# Patient Record
Sex: FEMALE | Race: WHITE | Employment: FULL TIME | URBAN - METROPOLITAN AREA
[De-identification: names, ages, dates, MRNs, and addresses within clinical notes are randomized per-mention and may not be internally consistent; named-entity substitution may affect disease eponyms.]

---

## 2017-11-02 ENCOUNTER — OFFICE VISIT (OUTPATIENT)
Dept: NEUROLOGY | Age: 57
End: 2017-11-02

## 2017-11-02 VITALS
BODY MASS INDEX: 34.66 KG/M2 | DIASTOLIC BLOOD PRESSURE: 100 MMHG | WEIGHT: 208 LBS | HEIGHT: 65 IN | SYSTOLIC BLOOD PRESSURE: 150 MMHG

## 2017-11-02 DIAGNOSIS — R20.0 NUMBNESS AND TINGLING: Primary | ICD-10-CM

## 2017-11-02 DIAGNOSIS — M79.609 PAIN IN EXTREMITY, UNSPECIFIED EXTREMITY: ICD-10-CM

## 2017-11-02 DIAGNOSIS — R20.2 NUMBNESS AND TINGLING: Primary | ICD-10-CM

## 2017-11-02 RX ORDER — VALACYCLOVIR HYDROCHLORIDE 500 MG/1
TABLET, FILM COATED ORAL 2 TIMES DAILY
Status: ON HOLD | COMMUNITY
End: 2022-04-19

## 2017-11-02 RX ORDER — RANITIDINE 150 MG/1
150 TABLET, FILM COATED ORAL 2 TIMES DAILY
Status: ON HOLD | COMMUNITY
End: 2022-04-19

## 2017-11-02 RX ORDER — BUMETANIDE 2 MG/1
2 TABLET ORAL DAILY
Status: ON HOLD | COMMUNITY
End: 2022-04-19

## 2017-11-02 RX ORDER — TOPIRAMATE 25 MG/1
TABLET ORAL 2 TIMES DAILY
COMMUNITY
End: 2017-12-27

## 2017-11-02 RX ORDER — TORSEMIDE 20 MG/1
TABLET ORAL DAILY
Status: ON HOLD | COMMUNITY
End: 2022-04-19

## 2017-11-02 RX ORDER — AMITRIPTYLINE HYDROCHLORIDE 10 MG/1
TABLET, FILM COATED ORAL
COMMUNITY
End: 2017-12-27

## 2017-11-02 RX ORDER — POTASSIUM CHLORIDE 750 MG/1
10 CAPSULE, EXTENDED RELEASE ORAL DAILY
COMMUNITY

## 2017-11-02 RX ORDER — PHENTERMINE HYDROCHLORIDE 30 MG/1
30 CAPSULE ORAL
COMMUNITY
End: 2017-12-27
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
## 2017-11-02 NOTE — MR AVS SNAPSHOT
Visit Information Date & Time Provider Department Dept. Phone Encounter #  
 11/2/2017  1:30 PM Tianna Leyva NP [address] [facility] [phone] [id number] Follow-up Instructions Return after testing. Upcoming Health Maintenance Date Due Hepatitis C Screening 1960 DTaP/Tdap/Td series (1 - Tdap) 9/7/1981 PAP AKA CERVICAL CYTOLOGY 9/7/1981 BREAST CANCER SCRN MAMMOGRAM 9/7/2010 FOBT Q 1 YEAR AGE 50-75 9/7/2010 INFLUENZA AGE 9 TO ADULT 8/1/2017 Allergies as of 11/2/2017  Never Reviewed Severity Noted Reaction Type Reactions Thimerosal  11/02/2017    Unknown (comments) Current Immunizations  Never Reviewed No immunizations on file. Not reviewed this visit You Were Diagnosed With   
  
 Codes Comments Numbness and tingling    -  Primary ICD-10-CM: R20.0, R20.2 ICD-9-CM: 782.0 Pain in extremity, unspecified extremity     ICD-10-CM: M79.609 ICD-9-CM: 729.5 Vitals BP Height(growth percentile) Weight(growth percentile) BMI Smoking Status (!) 150/100 5' 5\" (1.651 m) 208 lb (94.3 kg) 34.61 kg/m2 Never Smoker Vitals History BMI and BSA Data Body Mass Index Body Surface Area  
 34.61 kg/m 2 2.08 m 2 Your Updated Medication List  
  
   
This list is accurate as of: 11/2/17  2:14 PM.  Always use your most recent med list.  
  
  
  
  
 Rohini Layer Take  by mouth. amitriptyline 10 mg tablet Commonly known as:  ELAVIL Take  by mouth nightly. bumetanide 2 mg tablet Commonly known as:  Fabi Juliet Take 2 mg by mouth daily. LINZESS 290 mcg Cap capsule Generic drug:  linaclotide Take  by mouth Daily (before breakfast). NEXIUM 24HR PO Take  by mouth.  
  
 phentermine 30 mg capsule Take 30 mg by mouth every morning. potassium chloride SA 10 mEq capsule Commonly known as:  Jimi Se Take 10 mEq by mouth two (2) times a day.   
  
 topiramate 25 mg tablet Commonly known as:  TOPAMAX Take  by mouth two (2) times a day. torsemide 20 mg tablet Commonly known as:  DEMADEX Take  by mouth daily. valACYclovir 500 mg tablet Commonly known as:  VALTREX Take  by mouth two (2) times a day. ZANTAC 150 mg tablet Generic drug:  raNITIdine Take 150 mg by mouth two (2) times a day. Follow-up Instructions Return after testing. To-Do List   
 11/02/2017 Neurology:  EMG LIMITED Patient Instructions PRESCRIPTION REFILL POLICY [facility] Statement to Patients April 1, 2014 In an effort to ensure the large volume of patient prescription refills is processed in the most efficient and expeditious manner, we are asking our patients to assist us by calling your Pharmacy for all prescription refills, this will include also your  Mail Order Pharmacy. The pharmacy will contact our office electronically to continue the refill process. Please do not wait until the last minute to call your pharmacy. We need at least 48 hours (2days) to fill prescriptions. We also encourage you to call your pharmacy before going to  your prescription to make sure it is ready. With regard to controlled substance prescription refill requests (narcotic refills) that need to be picked up at our office, we ask your cooperation by providing us with at least 72 hours (3days) notice that you will need a refill. We will not refill narcotic prescription refill requests after 4:00pm on any weekday, Monday through Thursday, or after 2:00pm on Fridays, or on the weekends. We encourage everyone to explore another way of getting your prescription refill request processed using [employer], our patient web portal through our electronic medical record system.  AGLOGIC is an efficient and effective way to communicate your medication request directly to the office and  downloadable as an laron on your smart phone . [employer] also features a review functionality that allows you to view your medication list as well as leave messages for your physician. Are you ready to get connected? If so please review the attatched instructions or speak to any of our staff to get you set up right away! Thank you so much for your cooperation. Should you have any questions please contact our Practice Administrator. The Physicians and Staff,  [facility] Babak Regan [address] What is a living will? A living will is a legal form you use to write down the kind of care you want at the end of your life. It is used by the health professionals who will treat you if you aren't able to decide for yourself. If you put your wishes in writing, your loved ones and others will know what kind of care you want. They won't need to guess. This can ease your mind and be helpful to others. A living will is not the same as an estate or property will. An estate will explains what you want to happen with your money and property after you die. Is a living will a legal document? A living will is a legal document. Each state has its own laws about living rus[place]. If you move to another state, make sure that your living will is legal in the state where you now live. Or you might use a universal form that has been approved by many states. This kind of form can sometimes be completed and stored online. Your electronic copy will then be available wherever you have a connection to the Internet. In most cases, doctors will respect your wishes even if you have a form from a different state. · You don't need an  to complete a living will. But legal advice can be helpful if your state's laws are unclear, your health history is complicated, or your family can't agree on what should be in your living will. · You can change your living will at any time.  Some people find that their wishes about end-of-life care change as their health changes. · In addition to making a living will, think about completing a medical power of  form. This form lets you name the person you want to make end-of-life treatment decisions for you (your \"health care agent\") if you're not able to. Many hospitals and nursing homes will give you the forms you need to complete a living will and a medical power of . · Your living will is used only if you can't make or communicate decisions for yourself anymore. If you become able to make decisions again, you can accept or refuse any treatment, no matter what you wrote in your living will. · Your state may offer an online registry. This is a place where you can store your living will online so the doctors and nurses who need to treat you can find it right away. What should you think about when creating a living will? Talk about your end-of-life wishes with your family members and your doctor. Let them know what you want. That way the people making decisions for you won't be surprised by your choices. Think about these questions as you make your living will: · Do you know enough about life support methods that might be used? If not, talk to your doctor so you know what might be done if you can't breathe on your own, your heart stops, or you're unable to swallow. · What things would you still want to be able to do after you receive life-support methods? Would you want to be able to walk? To speak? To eat on your own? To live without the help of machines? · If you have a choice, where do you want to be cared for? In your home? At a hospital or nursing home? · Do you want certain [employer] practices performed if you become very ill? · If you have a choice at the end of your life, where would you prefer to die? At home? In a hospital or nursing home? Somewhere else? · Would you prefer to be buried or cremated? · Do you want your organs to be donated after you die?  
What should you do with your living will? · Make sure that your family members and your health care agent have copies of your living will. · Give your doctor a copy of your living will to keep in your medical record. If you have more than one doctor, make sure that each one has a copy. · You may want to put a copy of your living will where it can be easily found. Where can you learn more? Go to http://stephan-jc.info/. Enter L296 in the search box to learn more about \"Learning About Living [place]. \" Current as of: September 24, 2016 Content Version: 11.4 © 5694-3983 [employer]. Care instructions adapted under license by [employer] (which disclaims liability or warranty for this information). If you have questions about a medical condition or this instruction, always ask your healthcare professional. [address] any warranty or liability for your use of this information. Advance Directives: Care Instructions Your Care Instructions An advance directive is a legal way to state your wishes at the end of your life. It tells your family and your doctor what to do if you can no longer say what you want. There are two main types of advance directives. You can change them any time that your wishes change. · A living will tells your family and your doctor your wishes about life support and other treatment. · A durable power of  for health care lets you name a person to make treatment decisions for you when you can't speak for yourself. This person is called a health care agent. If you do not have an advance directive, decisions about your medical care may be made by a doctor or a  who doesn't know you. It may help to think of an advance directive as a gift to the people who care for you. If you have one, they won't have to make tough decisions by themselves. Follow-up care is a key part of your treatment and safety.  Be sure to make and go to all appointments, and call your doctor if you are having problems. It's also a good idea to know your test results and keep a list of the medicines you take. How can you care for yourself at home? · Discuss your wishes with your loved ones and your doctor. This way, there are no surprises. · Many states have a unique form. Or you might use a universal form that has been approved by many states. This kind of form can sometimes be completed and stored online. Your electronic copy will then be available wherever you have a connection to the Internet. In most cases, doctors will respect your wishes even if you have a form from a different state. · You don't need a  to do an advance directive. But you may want to get legal advice. · Think about these questions when you prepare an advance directive: ¨ Who do you want to make decisions about your medical care if you are not able to? Many people choose a family member or close friend. ¨ Do you know enough about life support methods that might be used? If not, talk to your doctor so you understand. ¨ What are you most afraid of that might happen? You might be afraid of having pain, losing your independence, or being kept alive by machines. ¨ Where would you prefer to die? Choices include your home, a hospital, or a nursing home. ¨ Would you like to have information about hospice care to support you and your family? ¨ Do you want to donate organs when you die? ¨ Do you want certain [employer] practices performed before you die? If so, put your wishes in the advance directive. · Read your advance directive every year, and make changes as needed. When should you call for help? Be sure to contact your doctor if you have any questions. Where can you learn more? Go to http://stephan-jc.info/. Enter R264 in the search box to learn more about \"Advance Directives: Care Instructions. \" Current as of: September 24, 2016 Content Version: 11.4 © 1129-6625 [employer]. Care instructions adapted under license by [employer] (which disclaims liability or warranty for this information). If you have questions about a medical condition or this instruction, always ask your healthcare professional. [address] any warranty or liability for your use of this information. Introducing [facility] [place] [facility]! Padmini Steve introduces [employer] patient portal. Now you can access parts of your medical record, email your doctor's office, and request medication refills online. 1. In your internet browser, go to [email or web address] 2. Click on the First Time User? Click Here link in the Sign In box. You will see the New Member Sign Up page. 3. Enter your [employer] Access Code exactly as it appears below. You will not need to use this code after youve completed the sign-up process. If you do not sign up before the expiration date, you must request a new code. · [employer] Access Code: [email or web address] Expires: 1/31/2018  2:13 PM 
 
4. Enter the last four digits of your Social Security Number (xxxx) and Date of Birth (mm/dd/yyyy) as indicated and click Submit. You will be taken to the next sign-up page. 5. Create a [employer] ID. This will be your [employer] login ID and cannot be changed, so think of one that is secure and easy to remember. 6. Create a [employer] password. You can change your password at any time. 7. Enter your Password Reset Question and Answer. This can be used at a later time if you forget your password. 8. Enter your e-mail address. You will receive e-mail notification when new information is available in [address] 9. Click Sign Up. You can now view and download portions of your medical record. 10. Click the Download Summary menu link to download a portable copy of your medical information.  
 
If you have questions, please visit the Frequently Asked Questions section of the [employer] website. Remember,  is NOT to be used for urgent needs. For medical emergencies, dial 911. Now available from your iPhone and Android! Please provide this summary of care documentation to your next provider. Your primary care clinician is listed as Sergio Mustafa. If you have any questions after today's visit, please call [phone].

## 2017-11-02 NOTE — PROGRESS NOTES
Patient is here for numbness and tingling in both arms and legs at times and pain when she lays down. Numbness occurs in arms when bent at 90 degree angle. It is now uncomfortable to sit for long length of time. Pain mainly on the left side. When laying on her right side her left arm and leg are in pain, elbow and knee being the most painful.

## 2017-11-02 NOTE — PATIENT INSTRUCTIONS
10 [facility]   Statement to Patients  April 1, 2014      In an effort to ensure the large volume of patient prescription refills is processed in the most efficient and expeditious manner, we are asking our patients to assist us by calling your Pharmacy for all prescription refills, this will include also your  Mail Order Pharmacy. The pharmacy will contact our office electronically to continue the refill process. Please do not wait until the last minute to call your pharmacy. We need at least 48 hours (2days) to fill prescriptions. We also encourage you to call your pharmacy before going to  your prescription to make sure it is ready. With regard to controlled substance prescription refill requests (narcotic refills) that need to be picked up at our office, we ask your cooperation by providing us with at least 72 hours (3days) notice that you will need a refill. We will not refill narcotic prescription refill requests after 4:00pm on any weekday, Monday through Thursday, or after 2:00pm on Fridays, or on the weekends. We encourage everyone to explore another way of getting your prescription refill request processed using Yotta[employer], our patient web portal through our electronic medical record system. [employer]ottaa is an efficient and effective way to communicate your medication request directly to the office and  downloadable as an laron on your smart phone . Yottaa also features a review functionality that allows you to view your medication list as well as leave messages for your physician. Are you ready to get connected? If so please review the attatched instructions or speak to any of our staff to get you set up right away! Thank you so much for your cooperation. Should you have any questions please contact our Practice Administrator.     The Physicians and Staff,  [facility] [id number] [address]  What is a living will?    A living will is a legal form you use to write down the kind of care you want at the end of your life. It is used by the health professionals who will treat you if you aren't able to decide for yourself. If you put your wishes in writing, your loved ones and others will know what kind of care you want. They won't need to guess. This can ease your mind and be helpful to others. A living will is not the same as an estate or property will. An estate will explains what you want to happen with your money and property after you die. Is a living will a legal document? A living will is a legal document. Each state has its own laws about living russo. If you move to another state, make sure that your living will is legal in the state where you now live. Or you might use a universal form that has been approved by many states. This kind of form can sometimes be completed and stored online. Your electronic copy will then be available wherever you have a connection to the Internet. In most cases, doctors will respect your wishes even if you have a form from a different state. · You don't need an  to complete a living will. But legal advice can be helpful if your state's laws are unclear, your health history is complicated, or your family can't agree on what should be in your living will. · You can change your living will at any time. Some people find that their wishes about end-of-life care change as their health changes. · In addition to making a living will, think about completing a medical power of  form. This form lets you name the person you want to make end-of-life treatment decisions for you (your \"health care agent\") if you're not able to. Many hospitals and nursing homes will give you the forms you need to complete a living will and a medical power of . · Your living will is used only if you can't make or communicate decisions for yourself anymore.  If you become able to make decisions again, you can accept or refuse any treatment, no matter what you wrote in your living will. · Your state may offer an online registry. This is a place where you can store your living will online so the doctors and nurses who need to treat you can find it right away. What should you think about when creating a living will? Talk about your end-of-life wishes with your family members and your doctor. Let them know what you want. That way the people making decisions for you won't be surprised by your choices. Think about these questions as you make your living will:  · Do you know enough about life support methods that might be used? If not, talk to your doctor so you know what might be done if you can't breathe on your own, your heart stops, or you're unable to swallow. · What things would you still want to be able to do after you receive life-support methods? Would you want to be able to walk? To speak? To eat on your own? To live without the help of machines? · If you have a choice, where do you want to be cared for? In your home? At a hospital or nursing home? · Do you want certain Yarsanism practices performed if you become very ill? · If you have a choice at the end of your life, where would you prefer to die? At home? In a hospital or nursing home? Somewhere else? · Would you prefer to be buried or cremated? · Do you want your organs to be donated after you die? What should you do with your living will? · Make sure that your family members and your health care agent have copies of your living will. · Give your doctor a copy of your living will to keep in your medical record. If you have more than one doctor, make sure that each one has a copy. · You may want to put a copy of your living will where it can be easily found. Where can you learn more? Go to http://stephan-jc.info/. Enter K906 in the search box to learn more about \"Learning About Living Perdaniel. \"  Current as of: September 24, 2016  Content Version: 11.4  © 7352-3872 [employer]. Care instructions adapted under license by [employer] (which disclaims liability or warranty for this information). If you have questions about a medical condition or this instruction, always ask your healthcare professional. [address] any warranty or liability for your use of this information. Advance Directives: Care Instructions  Your Care Instructions  An advance directive is a legal way to state your wishes at the end of your life. It tells your family and your doctor what to do if you can no longer say what you want. There are two main types of advance directives. You can change them any time that your wishes change. · A living will tells your family and your doctor your wishes about life support and other treatment. · A durable power of  for health care lets you name a person to make treatment decisions for you when you can't speak for yourself. This person is called a health care agent. If you do not have an advance directive, decisions about your medical care may be made by a doctor or a  who doesn't know you. It may help to think of an advance directive as a gift to the people who care for you. If you have one, they won't have to make tough decisions by themselves. Follow-up care is a key part of your treatment and safety. Be sure to make and go to all appointments, and call your doctor if you are having problems. It's also a good idea to know your test results and keep a list of the medicines you take. How can you care for yourself at home? · Discuss your wishes with your loved ones and your doctor. This way, there are no surprises. · Many states have a unique form. Or you might use a universal form that has been approved by many states. This kind of form can sometimes be completed and stored online.  Your electronic copy will then be available wherever you have a connection to the Internet. In most cases, doctors will respect your wishes even if you have a form from a different state. · You don't need a  to do an advance directive. But you may want to get legal advice. · Think about these questions when you prepare an advance directive:  ¨ Who do you want to make decisions about your medical care if you are not able to? Many people choose a family member or close friend. ¨ Do you know enough about life support methods that might be used? If not, talk to your doctor so you understand. ¨ What are you most afraid of that might happen? You might be afraid of having pain, losing your independence, or being kept alive by machines. ¨ Where would you prefer to die? Choices include your home, a hospital, or a nursing home. ¨ Would you like to have information about hospice care to support you and your family? ¨ Do you want to donate organs when you die? ¨ Do you want certain Restorationist practices performed before you die? If so, put your wishes in the advance directive. · Read your advance directive every year, and make changes as needed. When should you call for help? Be sure to contact your doctor if you have any questions. Where can you learn more? Go to http://stephan-jc.info/. Enter R264 in the search box to learn more about \"Advance Directives: Care Instructions. \"  Current as of: September 24, 2016  Content Version: 11.4  © 3401-7411 [employer]. Care instructions adapted under license by [employer] (which disclaims liability or warranty for this information). If you have questions about a medical condition or this instruction, always ask your healthcare professional. [address] any warranty or liability for your use of this information.

## 2017-11-06 ENCOUNTER — OFFICE VISIT (OUTPATIENT)
Dept: NEUROLOGY | Age: 57
End: 2017-11-06

## 2017-11-06 DIAGNOSIS — R20.2 PARESTHESIA: Primary | ICD-10-CM

## 2017-11-06 NOTE — PROCEDURES
EMG/ NCS Report  [facility], [address]  Ph: [phone]/ [phone]  FAX: [phone]/ [phone]  Test Date:  2017    Patient: Colleen Ramirez : 1960 Physician: Perry Francisco MD   Sex: Female Height: ' \" Ref Phys: Becca Neigh   ID#: [id number] Weight:  lbs. Technician: Oralia Henry     Patient History / Exam:  CC:NUMBNESS,TINGLING IN ALL EXTREMITIES.; LEFT WORSE THAN RIGHT. Patient is coming for neuropathy evaluation      EMG & NCV Findings:  Evaluation of the left Fibular motor nerve showed normal distal onset latency (3.4 ms), normal amplitude (4.0 mV), normal conduction velocity (B Fib-Ankle, 46 m/s), and normal conduction velocity (Poplt-B Fib, 48 m/s). The left median motor nerve showed normal distal onset latency (4.5 ms), normal amplitude (9.4 mV), and normal conduction velocity (Elbow-Wrist, 53 m/s). The left tibial motor nerve showed normal distal onset latency (6.0 ms), normal amplitude (11.5 mV), and normal conduction velocity (Knee-Ankle, 60 m/s). The left ulnar motor nerve showed normal distal onset latency (2.7 ms), normal amplitude (11.1 mV), normal conduction velocity (B Elbow-Wrist, 58 m/s), and normal conduction velocity (A Elbow-B Elbow, 50 m/s). The left median sensory nerve showed prolonged distal peak latency (4.5 ms) and normal amplitude (57.1 µV). The left radial sensory, the left sural sensory, and the left ulnar sensory nerves showed normal distal peak latency (L2.1, L3.9, L3.9 ms) and normal amplitude (L53.1, L16.4, L34.0 µV). All F Wave latencies were within normal limits. All examined muscles (as indicated in the following table) showed no evidence of electrical instability.         Impression:  ABNORMAL    Extensive electrodiagnostic examination of the left upper and left lower extremities shows evidence of a left median neuropathy at or distal to the wrist, consistent with a clinical diagnosis of carpal tunnel syndrome, mild in degree electrically. There is no evidence of a left cervical motor radiculopathy. Electrodiagnostic examination of the left lower extremity is within normal limits.           Miracle Rojas MD  Diplomate, American Board of Psychiatry and Neurology  Diplomate, Neuromuscular Medicine  Diplomate, American Board of Electrodiagnostic Medicine        Nerve Conduction Studies  Anti Sensory Summary Table     Stim Site NR Peak (ms) Norm Peak (ms) P-T Amp (µV) Norm P-T Amp Site1 Site2 Dist (cm)   Left Median Anti Sensory (2nd Digit)  32.8°C   Wrist    4.5 <4 57.1 >13 Wrist 2nd Digit 14.0   Left Radial Anti Sensory (Base 1st Digit)  30.5°C   Wrist    2.1 <2.8 53.1 >11 Wrist Base 1st Digit 10.0   Left Sural Anti Sensory (Lat Mall)  24°C   Calf    3.9 <4.5 16.4 >4.0 Calf Lat Mall 14.0   Left Ulnar Anti Sensory (5th Digit)  31.3°C   Wrist    3.9 <4.0 34.0 >9 Wrist 5th Digit 14.0     Motor Summary Table     Stim Site NR Onset (ms) Norm Onset (ms) O-P Amp (mV) Norm O-P Amp Amp (Prev) (%) Site1 Site2 Dist (cm) Nadir (m/s) Norm Nadir (m/s)   Left Fibular Motor (Ext Dig Brev)  29.9°C   Ankle    3.4 <6.5 4.0 >2.6 100.0 Ankle Ext Dig Brev 8.0     B Fib    10.6  4.0  100.0 B Fib Ankle 33.0 46 >38   Poplt    12.7  4.1  102.5 Poplt B Fib 10.0 48 >42   Left Median Motor (Abd Poll Brev)  30.7°C   Wrist    4.5 <4.5 9.4 >4.1 100.0 Wrist Abd Poll Brev 8.0     Elbow    8.2  9.1  96.8 Elbow Wrist 19.5 53 >49   Left Tibial Motor (Abd Moore Brev)  28.8°C   Ankle    6.0 <6.1 11.5 >5.3 100.0 Ankle Abd Moore Brev 8.0     Knee    12.0  6.6  57.4 Knee Ankle 36.0 60 >39   Left Ulnar Motor (Abd Dig Minimi)  30.3°C   Wrist    2.7 <3.1 11.1 >7.0 100.0 Wrist Abd Dig Minimi 8.0  >50   B Elbow    6.3  10.8  97.3 B Elbow Wrist 21.0 58 >50   A Elbow    8.3  10.6  98.1 A Elbow B Elbow 10.0 50 >50     F Wave Studies     NR F-Lat (ms) Lat Norm (ms) L-R F-Lat (ms) L-R Lat Norm   Left Peroneal (Mrkrs) (EDB)  28.2°C      47.36 <56  <5.1   Left Ulnar (Mrkrs) (Abd Dig Min)  29.9°C      28.70 <32  <2.5     H Reflex Studies     NR H-Lat (ms) L-R H-Lat (ms) L-R Lat Norm   Left Tibial (Gastroc)  27.9°C      33.20  <2.0     EMG     Side Muscle Nerve Root Ins Act Fibs Psw Recrt Duration Amp Poly Comment   Left Ext Dig Brev Dp Br Peron L5, S1 Nml Nml Nml Nml Nml Nml Nml    Left AbdHallucis MedPlantar S1-2 Nml Nml Nml Nml Nml Nml Nml    Left AntTibialis Dp Br Peron L4-5 Nml Nml Nml Nml Nml Nml Nml    Left MedGastroc Tibial S1-2 Nml Nml Nml Nml Nml Nml Nml    Left VastusLat Femoral L2-4 Nml Nml Nml Nml Nml Nml Nml    Left 1stDorInt Ulnar C8-T1 Nml Nml Nml Nml Nml Nml Nml    Left ExtIndicis Radial (Post Int) C7-8 Nml Nml Nml Nml Nml Nml Nml    Left Abd Poll Brev Median C8-T1 Nml Nml Nml Nml Nml Nml Nml    Left Biceps Musculocut C5-6 Nml Nml Nml Nml Nml Nml Nml    Left Triceps Radial C6-7-8 Nml Nml Nml Nml Nml Nml Nml    Left Deltoid Axillary C5-6 Nml Nml Nml Nml Nml Nml Nml    Left Lower Cerv Parasp Rami C7,T1 Nml Nml Nml Nml Nml Nml Nml    Left GluteusMed SupGluteal L4-S1 Nml Nml Nml Nml Nml Nml Nml    Left Lower Lumb Parasp Rami L5,S1 Nml Nml Nml Nml Nml Nml Nml                Nerve Conduction Studies  Anti Sensory Left/Right Comparison     Stim Site L Lat (ms) R Lat (ms) L-R Lat (ms) L Amp (µV) R Amp (µV) L-R Amp (%) Site1 Site2 L Nadir (m/s) R Andir (m/s) L-R Nadir (m/s)   Median Anti Sensory (2nd Digit)  32.8°C   Wrist 3.3   57.1   Wrist 2nd Digit 42     Radial Anti Sensory (Base 1st Digit)  30.5°C   Wrist 1.7   53.1   Wrist Base 1st Digit 59     Sural Anti Sensory (Lat Mall)  24°C   Calf 3.3   16.4   Calf Lat Mall 42     Ulnar Anti Sensory (5th Digit)  31.3°C   Wrist 3.1   34.0   Wrist 5th Digit 45       Motor Left/Right Comparison     Stim Site L Lat (ms) R Lat (ms) L-R Lat (ms) L Amp (mV) R Amp (mV) L-R Amp (%) Site1 Site2 L Nadir (m/s) R Nadir (m/s) L-R Nadir (m/s)   Fibular Motor (Ext Dig Brev)  29.9°C   Ankle 3.4   4.0   Ankle Ext Dig Brev      B Fib 10.6   4.0   B Fib Ankle 46     Poplt 12.7   4.1   Poplt B Fib 48     Median Motor (Abd Poll Brev)  30.7°C   Wrist 4.5   9.4   Wrist Abd Poll Brev      Elbow 8.2   9.1   Elbow Wrist 53     Tibial Motor (Abd Moore Brev)  28.8°C   Ankle 6.0   11.5   Ankle Abd Moore Brev      Knee 12.0   6.6   Knee Ankle 60     Ulnar Motor (Abd Dig Minimi)  30.3°C   Wrist 2.7   11.1   Wrist Abd Dig Minimi      B Elbow 6.3   10.8   B Elbow Wrist 58     A Elbow 8.3   10.6   A Elbow B Elbow 50           Waveforms:

## 2017-11-29 ENCOUNTER — OFFICE VISIT (OUTPATIENT)
Dept: NEUROLOGY | Age: 57
End: 2017-11-29

## 2017-11-29 VITALS
SYSTOLIC BLOOD PRESSURE: 124 MMHG | BODY MASS INDEX: 33.49 KG/M2 | RESPIRATION RATE: 20 BRPM | WEIGHT: 201 LBS | HEIGHT: 65 IN | DIASTOLIC BLOOD PRESSURE: 70 MMHG

## 2017-11-29 DIAGNOSIS — R20.2 PARESTHESIA OF UPPER AND LOWER EXTREMITIES OF BOTH SIDES: ICD-10-CM

## 2017-11-29 DIAGNOSIS — R20.0 NUMBNESS AND TINGLING: Primary | ICD-10-CM

## 2017-11-29 DIAGNOSIS — R20.2 NUMBNESS AND TINGLING: Primary | ICD-10-CM

## 2017-11-29 RX ORDER — NORTRIPTYLINE HYDROCHLORIDE 10 MG/1
10 CAPSULE ORAL
Qty: 30 CAP | Refills: 5 | Status: SHIPPED | OUTPATIENT
Start: 2017-11-29 | End: 2017-12-27

## 2017-11-29 NOTE — MR AVS SNAPSHOT
Visit Information Date & Time Provider Department Dept. Phone Encounter #  
 11/29/2017  9:00 AM Ramon Rodriguez NP [facility] [phone] [id number] Follow-up Instructions Return afte rMRI. Upcoming Health Maintenance Date Due Hepatitis C Screening 1960 DTaP/Tdap/Td series (1 - Tdap) 9/7/1981 PAP AKA CERVICAL CYTOLOGY 9/7/1981 BREAST CANCER SCRN MAMMOGRAM 9/7/2010 FOBT Q 1 YEAR AGE 50-75 9/7/2010 Influenza Age 5 to Adult 8/1/2017 Allergies as of 11/29/2017  Review Complete On: 11/2/2017 By: Ramon Rodriguez NP Severity Noted Reaction Type Reactions Thimerosal  11/02/2017    Unknown (comments) Current Immunizations  Never Reviewed No immunizations on file. Not reviewed this visit You Were Diagnosed With   
  
 Codes Comments Numbness and tingling    -  Primary ICD-10-CM: R20.0, R20.2 ICD-9-CM: 782.0 Paresthesia of upper and lower extremities of both sides     ICD-10-CM: R20.2 ICD-9-CM: 026. 0 Vitals BP Resp Height(growth percentile) Weight(growth percentile) BMI Smoking Status 124/70 20 5' 5\" (1.651 m) 201 lb (91.2 kg) 33.45 kg/m2 Never Smoker Vitals History BMI and BSA Data Body Mass Index Body Surface Area  
 33.45 kg/m 2 2.05 m 2 Preferred Pharmacy Pharmacy Name Phone [facility] [address] [place] [address] [address]  [facility] [phone] Your Updated Medication List  
  
   
This list is accurate as of: 11/29/17  9:30 AM.  Always use your most recent med list.  
  
  
  
  
 Fer Filter Take  by mouth. amitriptyline 10 mg tablet Commonly known as:  ELAVIL Take  by mouth nightly. bumetanide 2 mg tablet Commonly known as:  Welby Shows Take 2 mg by mouth daily. LINZESS 290 mcg Cap capsule Generic drug:  linaclotide Take  by mouth Daily (before breakfast).   
  
 [facility]  
Take  by mouth. nortriptyline 10 mg capsule Commonly known as:  PAMELOR Take 1 Cap by mouth nightly. phentermine 30 mg capsule Take 30 mg by mouth every morning. potassium chloride SA 10 mEq capsule Commonly known as:  Aleja Paulo Take 10 mEq by mouth two (2) times a day. topiramate 25 mg tablet Commonly known as:  TOPAMAX Take  by mouth two (2) times a day. torsemide 20 mg tablet Commonly known as:  DEMADEX Take  by mouth daily. valACYclovir 500 mg tablet Commonly known as:  VALTREX Take  by mouth two (2) times a day. ZANTAC 150 mg tablet Generic drug:  raNITIdine Take 150 mg by mouth two (2) times a day. Prescriptions Sent to Pharmacy Refills  
 nortriptyline (PAMELOR) 10 mg capsule 5 Sig: Take 1 Cap by mouth nightly. Class: Normal  
 Pharmacy: [facility] [address] [place] [address] [address] AT [facility] [address] ([facility] Ph #: [phone] Route: Oral  
  
Follow-up Instructions Return afte [facility]. To-Do List   
 11/29/2017 Imaging:  MRI BRAIN W WO CONT   
  
 11/29/2017 Imaging:  MRI CERV SPINE W WO CONT Referral Information Referral ID Referred By Referred To  
  
 [id number] Jordyn Morillo Not Available Visits Status Start Date End Date 1 New Request 11/29/17 11/29/18 If your referral has a status of pending review or denied, additional information will be sent to support the outcome of this decision. Referral ID Referred By Referred To  
 [id number] Jordyn Morillo Not Available Visits Status Start Date End Date 1 New Request 11/29/17 11/29/18 If your referral has a status of pending review or denied, additional information will be sent to support the outcome of this decision. Patient Instructions PRESCRIPTION REFILL POLICY [facility] Statement to Patients April 1, 2014 In an effort to ensure the large volume of patient prescription refills is processed in the most efficient and expeditious manner, we are asking our patients to assist us by calling your Pharmacy for all prescription refills, this will include also your  Mail Order Pharmacy. The pharmacy will contact our office electronically to continue the refill process. Please do not wait until the last minute to call your pharmacy. We need at least 48 hours (2days) to fill prescriptions. We also encourage you to call your pharmacy before going to  your prescription to make sure it is ready. With regard to controlled substance prescription refill requests (narcotic refills) that need to be picked up at our office, we ask your cooperation by providing us with at least 72 hours (3days) notice that you will need a refill. We will not refill narcotic prescription refill requests after 4:00pm on any weekday, Monday through Thursday, or after 2:00pm on Fridays, or on the weekends. We encourage everyone to explore another way of getting your prescription refill request processed using [employer] our patient web portal through our electronic medical record system. [employer] is an efficient and effective way to communicate your medication request directly to the office and  downloadable as an laron on your smart phone . [employer] also features a review functionality that allows you to view your medication list as well as leave messages for your physician. Are you ready to get connected? If so please review the attatched instructions or speak to any of our staff to get you set up right away! Thank you so much for your cooperation. Should you have any questions please contact our Practice Administrator. The Physicians and Staff,  [facility] Babak Regan [address] What is a living will? A living will is a legal form you use to write down the kind of care you want at the end of your life.  It is used by the health professionals who will treat you if you aren't able to decide for yourself. If you put your wishes in writing, your loved ones and others will know what kind of care you want. They won't need to guess. This can ease your mind and be helpful to others. A living will is not the same as an estate or property will. An estate will explains what you want to happen with your money and property after you die. Is a living will a legal document? A living will is a legal document. Each state has its own laws about living russo. If you move to another state, make sure that your living will is legal in the state where you now live. Or you might use a universal form that has been approved by many states. This kind of form can sometimes be completed and stored online. Your electronic copy will then be available wherever you have a connection to the Internet. In most cases, doctors will respect your wishes even if you have a form from a different state. · You don't need an  to complete a living will. But legal advice can be helpful if your state's laws are unclear, your health history is complicated, or your family can't agree on what should be in your living will. · You can change your living will at any time. Some people find that their wishes about end-of-life care change as their health changes. · In addition to making a living will, think about completing a medical power of  form. This form lets you name the person you want to make end-of-life treatment decisions for you (your \"health care agent\") if you're not able to. Many hospitals and nursing homes will give you the forms you need to complete a living will and a medical power of . · Your living will is used only if you can't make or communicate decisions for yourself anymore. If you become able to make decisions again, you can accept or refuse any treatment, no matter what you wrote in your living will.  
· Your state may offer an online registry. This is a place where you can store your living will online so the doctors and nurses who need to treat you can find it right away. What should you think about when creating a living will? Talk about your end-of-life wishes with your family members and your doctor. Let them know what you want. That way the people making decisions for you won't be surprised by your choices. Think about these questions as you make your living will: · Do you know enough about life support methods that might be used? If not, talk to your doctor so you know what might be done if you can't breathe on your own, your heart stops, or you're unable to swallow. · What things would you still want to be able to do after you receive life-support methods? Would you want to be able to walk? To speak? To eat on your own? To live without the help of machines? · If you have a choice, where do you want to be cared for? In your home? At a hospital or nursing home? · Do you want certain Restoration practices performed if you become very ill? · If you have a choice at the end of your life, where would you prefer to die? At home? In a hospital or nursing home? Somewhere else? · Would you prefer to be buried or cremated? · Do you want your organs to be donated after you die? What should you do with your living will? · Make sure that your family members and your health care agent have copies of your living will. · Give your doctor a copy of your living will to keep in your medical record. If you have more than one doctor, make sure that each one has a copy. · You may want to put a copy of your living will where it can be easily found. Where can you learn more? Go to http://stephan-jc.info/. Enter O197 in the search box to learn more about \"Learning About Living Perroy. \" Current as of: September 24, 2016 Content Version: 11.4 © 1421-7491 Healthwise[employer] Inc[employer].  Care instructions adapted under license by [employer] (which disclaims liability or warranty for this information). If you have questions about a medical condition or this instruction, always ask your healthcare professional. [address] any warranty or liability for your use of this information. Advance Directives: Care Instructions Your Care Instructions An advance directive is a legal way to state your wishes at the end of your life. It tells your family and your doctor what to do if you can no longer say what you want. There are two main types of advance directives. You can change them any time that your wishes change. · A living will tells your family and your doctor your wishes about life support and other treatment. · A durable power of  for health care lets you name a person to make treatment decisions for you when you can't speak for yourself. This person is called a health care agent. If you do not have an advance directive, decisions about your medical care may be made by a doctor or a  who doesn't know you. It may help to think of an advance directive as a gift to the people who care for you. If you have one, they won't have to make tough decisions by themselves. Follow-up care is a key part of your treatment and safety. Be sure to make and go to all appointments, and call your doctor if you are having problems. It's also a good idea to know your test results and keep a list of the medicines you take. How can you care for yourself at home? · Discuss your wishes with your loved ones and your doctor. This way, there are no surprises. · Many states have a unique form. Or you might use a universal form that has been approved by many states. This kind of form can sometimes be completed and stored online. Your electronic copy will then be available wherever you have a connection to the Internet. In most cases, doctors will respect your wishes even if you have a form from a different state.  
· You don't need a  to do an advance directive. But you may want to get legal advice. · Think about these questions when you prepare an advance directive: ¨ Who do you want to make decisions about your medical care if you are not able to? Many people choose a family member or close friend. ¨ Do you know enough about life support methods that might be used? If not, talk to your doctor so you understand. ¨ What are you most afraid of that might happen? You might be afraid of having pain, losing your independence, or being kept alive by machines. ¨ Where would you prefer to die? Choices include your home, a hospital, or a nursing home. ¨ Would you like to have information about hospice care to support you and your family? ¨ Do you want to donate organs when you die? ¨ Do you want certain Restorationist practices performed before you die? If so, put your wishes in the advance directive. · Read your advance directive every year, and make changes as needed. When should you call for help? Be sure to contact your doctor if you have any questions. Where can you learn more? Go to http://stephanSubblimejc.info/. Enter R264 in the search box to learn more about \"Advance Directives: Care Instructions. \" Current as of: September 24, 2016 Content Version: 11.4 © 4590-0938 Healthwise, Incorporated. Care instructions adapted under license by [employer] (which disclaims liability or warranty for this information). If you have questions about a medical condition or this instruction, always ask your healthcare professional. [place] [address] any warranty or liability for your use of this information. Introducing [facility]! Padmini Steve introduces [employer] patient portal. Now you can access parts of your medical record, email your doctor's office, and request medication refills online. 1. In your internet browser, go to [email or web address] 2. Click on the First Time User? Click Here link in the Sign In box. You will see the New Member Sign Up page. 3. Enter your Decide[employer] Access Code exactly as it appears below. You will not need to use this code after youve completed the sign-up process. If you do not sign up before the expiration date, you must request a new code. · [employer] Access Code: [email or web address] Expires: 1/31/2018  1:13 PM 
 
4. Enter the last four digits of your Social Security Number (xxxx) and Date of Birth (mm/dd/yyyy) as indicated and click Submit. You will be taken to the next sign-up page. 5. Create a DecideQuick ID. This will be your DecideQuick login ID and cannot be changed, so think of one that is secure and easy to remember. 6. Create a [employer] password. You can change your password at any time. 7. Enter your Password Reset Question and Answer. This can be used at a later time if you forget your password. 8. Enter your e-mail address. You will receive e-mail notification when new information is available in [address] 9. Click Sign Up. You can now view and download portions of your medical record. 10. Click the Download Summary menu link to download a portable copy of your medical information. If you have questions, please visit the Frequently Asked Questions section of the DecideQuick website. Remember, DecideQuick is NOT to be used for urgent needs. For medical emergencies, dial 911. Now available from your iPhone and Android! Please provide this summary of care documentation to your next provider. Your primary care clinician is listed as Bri Bajwa. If you have any questions after today's visit, please call [phone].
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
## 2017-11-29 NOTE — PATIENT INSTRUCTIONS
[address] [facility]   Statement to Patients  April 1, 2014      In an effort to ensure the large volume of patient prescription refills is processed in the most efficient and expeditious manner, we are asking our patients to assist us by calling your Pharmacy for all prescription refills, this will include also your  Mail Order Pharmacy. The pharmacy will contact our office electronically to continue the refill process. Please do not wait until the last minute to call your pharmacy. We need at least 48 hours (2days) to fill prescriptions. We also encourage you to call your pharmacy before going to  your prescription to make sure it is ready. With regard to controlled substance prescription refill requests (narcotic refills) that need to be picked up at our office, we ask your cooperation by providing us with at least 72 hours (3days) notice that you will need a refill. We will not refill narcotic prescription refill requests after 4:00pm on any weekday, Monday through Thursday, or after 2:00pm on Fridays, or on the weekends. We encourage everyone to explore another way of getting your prescription refill request processed using BlueRonin, our patient web portal through our electronic medical record system. BlueRonin is an efficient and effective way to communicate your medication request directly to the office and  downloadable as an laron on your smart phone . BlueRonin also features a review functionality that allows you to view your medication list as well as leave messages for your physician. Are you ready to get connected? If so please review the attatched instructions or speak to any of our staff to get you set up right away! Thank you so much for your cooperation. Should you have any questions please contact our Practice Administrator.     The Physicians and Staff,  [facility] [address] [address]  What is a living will?    A living will is a legal form you use to write down the kind of care you want at the end of your life. It is used by the health professionals who will treat you if you aren't able to decide for yourself. If you put your wishes in writing, your loved ones and others will know what kind of care you want. They won't need to guess. This can ease your mind and be helpful to others. A living will is not the same as an estate or property will. An estate will explains what you want to happen with your money and property after you die. Is a living will a legal document? A living will is a legal document. Each state has its own laws about living russo. If you move to another state, make sure that your living will is legal in the state where you now live. Or you might use a universal form that has been approved by many states. This kind of form can sometimes be completed and stored online. Your electronic copy will then be available wherever you have a connection to the Internet. In most cases, doctors will respect your wishes even if you have a form from a different state. · You don't need an  to complete a living will. But legal advice can be helpful if your state's laws are unclear, your health history is complicated, or your family can't agree on what should be in your living will. · You can change your living will at any time. Some people find that their wishes about end-of-life care change as their health changes. · In addition to making a living will, think about completing a medical power of  form. This form lets you name the person you want to make end-of-life treatment decisions for you (your \"health care agent\") if you're not able to. Many hospitals and nursing homes will give you the forms you need to complete a living will and a medical power of . · Your living will is used only if you can't make or communicate decisions for yourself anymore.  If you become able to make decisions again, you can accept or refuse any treatment, no matter what you wrote in your living will. · Your state may offer an online registry. This is a place where you can store your living will online so the doctors and nurses who need to treat you can find it right away. What should you think about when creating a living will? Talk about your end-of-life wishes with your family members and your doctor. Let them know what you want. That way the people making decisions for you won't be surprised by your choices. Think about these questions as you make your living will:  · Do you know enough about life support methods that might be used? If not, talk to your doctor so you know what might be done if you can't breathe on your own, your heart stops, or you're unable to swallow. · What things would you still want to be able to do after you receive life-support methods? Would you want to be able to walk? To speak? To eat on your own? To live without the help of machines? · If you have a choice, where do you want to be cared for? In your home? At a hospital or nursing home? · Do you want certain Confucianism practices performed if you become very ill? · If you have a choice at the end of your life, where would you prefer to die? At home? In a hospital or nursing home? Somewhere else? · Would you prefer to be buried or cremated? · Do you want your organs to be donated after you die? What should you do with your living will? · Make sure that your family members and your health care agent have copies of your living will. · Give your doctor a copy of your living will to keep in your medical record. If you have more than one doctor, make sure that each one has a copy. · You may want to put a copy of your living will where it can be easily found. Where can you learn more? Go to http://stephan-jc.info/. Enter E606 in the search box to learn more about \"Learning About Living Perdaniel. \"  Current as of: September 24, 2016  Content Version: 11.4  © 7761-4350 [employer]. Care instructions adapted under license by [employer] (which disclaims liability or warranty for this information). If you have questions about a medical condition or this instruction, always ask your healthcare professional. [address] any warranty or liability for your use of this information. Advance Directives: Care Instructions  Your Care Instructions  An advance directive is a legal way to state your wishes at the end of your life. It tells your family and your doctor what to do if you can no longer say what you want. There are two main types of advance directives. You can change them any time that your wishes change. · A living will tells your family and your doctor your wishes about life support and other treatment. · A durable power of  for health care lets you name a person to make treatment decisions for you when you can't speak for yourself. This person is called a health care agent. If you do not have an advance directive, decisions about your medical care may be made by a doctor or a  who doesn't know you. It may help to think of an advance directive as a gift to the people who care for you. If you have one, they won't have to make tough decisions by themselves. Follow-up care is a key part of your treatment and safety. Be sure to make and go to all appointments, and call your doctor if you are having problems. It's also a good idea to know your test results and keep a list of the medicines you take. How can you care for yourself at home? · Discuss your wishes with your loved ones and your doctor. This way, there are no surprises. · Many states have a unique form. Or you might use a universal form that has been approved by many states. This kind of form can sometimes be completed and stored online.  Your electronic copy will then be available wherever you have a connection to the Internet. In most cases, doctors will respect your wishes even if you have a form from a different state. · You don't need a  to do an advance directive. But you may want to get legal advice. · Think about these questions when you prepare an advance directive:  ¨ Who do you want to make decisions about your medical care if you are not able to? Many people choose a family member or close friend. ¨ Do you know enough about life support methods that might be used? If not, talk to your doctor so you understand. ¨ What are you most afraid of that might happen? You might be afraid of having pain, losing your independence, or being kept alive by machines. ¨ Where would you prefer to die? Choices include your home, a hospital, or a nursing home. ¨ Would you like to have information about hospice care to support you and your family? ¨ Do you want to donate organs when you die? ¨ Do you want certain Synagogue practices performed before you die? If so, put your wishes in the advance directive. · Read your advance directive every year, and make changes as needed. When should you call for help? Be sure to contact your doctor if you have any questions. Where can you learn more? Go to http://stephan-jc.info/. Enter R264 in the search box to learn more about \"Advance Directives: Care Instructions. \"  Current as of: September 24, 2016  Content Version: 11.4  © 1139-3977 [employer]. Care instructions adapted under license by [employer] (which disclaims liability or warranty for this information). If you have questions about a medical condition or this instruction, always ask your healthcare professional. [address] any warranty or liability for your use of this information.

## 2017-12-05 ENCOUNTER — TELEPHONE (OUTPATIENT)
Dept: NEUROLOGY | Age: 57
End: 2017-12-05

## 2017-12-05 DIAGNOSIS — R20.2 PARESTHESIA: Primary | ICD-10-CM

## 2017-12-05 NOTE — TELEPHONE ENCOUNTER
----- Message from Revizer sent at 12/5/2017  8:19 AM EST -----  Regarding: Lily Case/Telephone  Pt wanted to know if the MRI could be done with out contrast because she has issues with the dye that is used. Pt stated that her MRI is scheduled for 12/6/17  Best contact number is [phone].

## 2017-12-06 ENCOUNTER — HOSPITAL ENCOUNTER (OUTPATIENT)
Dept: MRI IMAGING | Age: 57
Discharge: HOME | End: 2017-12-06
Attending: NURSE PRACTITIONER
Payer: COMMERCIAL

## 2017-12-06 DIAGNOSIS — R20.0 NUMBNESS AND TINGLING: ICD-10-CM

## 2017-12-06 DIAGNOSIS — R20.2 NUMBNESS AND TINGLING: ICD-10-CM

## 2017-12-06 DIAGNOSIS — R20.2 PARESTHESIA OF UPPER AND LOWER EXTREMITIES OF BOTH SIDES: ICD-10-CM

## 2017-12-06 DIAGNOSIS — R20.2 PARESTHESIA: ICD-10-CM

## 2017-12-06 PROCEDURE — 70551 MRI BRAIN STEM W/O DYE: CPT

## 2017-12-06 PROCEDURE — 72141 MRI NECK SPINE W/O DYE: CPT

## 2017-12-27 ENCOUNTER — OFFICE VISIT (OUTPATIENT)
Dept: NEUROLOGY | Age: 57
End: 2017-12-27

## 2017-12-27 VITALS
WEIGHT: 202 LBS | DIASTOLIC BLOOD PRESSURE: 80 MMHG | HEART RATE: 111 BPM | OXYGEN SATURATION: 98 % | SYSTOLIC BLOOD PRESSURE: 160 MMHG | BODY MASS INDEX: 33.61 KG/M2

## 2017-12-27 DIAGNOSIS — R20.0 NUMBNESS AND TINGLING: Primary | ICD-10-CM

## 2017-12-27 DIAGNOSIS — R20.2 NUMBNESS AND TINGLING: Primary | ICD-10-CM

## 2017-12-27 NOTE — MR AVS SNAPSHOT
Visit Information Date & Time Provider Department Dept. Phone Encounter #  
 12/27/2017  9:00 AM DIVYA [facility]clure[facility] [phone] [id number] Follow-up Instructions Return if symptoms worsen or fail to improve. Upcoming Health Maintenance Date Due Hepatitis C Screening 1960 DTaP/Tdap/Td series (1 - Tdap) 9/7/1981 PAP AKA CERVICAL CYTOLOGY 9/7/1981 FOBT Q 1 YEAR AGE 50-75 9/7/2010 Influenza Age 5 to Adult 8/1/2017 Allergies as of 12/27/2017  Review Complete On: 11/2/2017 By: Reji Patiño NP Severity Noted Reaction Type Reactions Thimerosal  11/02/2017    Unknown (comments) Current Immunizations  Never Reviewed No immunizations on file. Not reviewed this visit Vitals BP Pulse Weight(growth percentile) SpO2 BMI Smoking Status 160/80 (!) 111 202 lb (91.6 kg) 98% 33.61 kg/m2 Never Smoker BMI and BSA Data Body Mass Index Body Surface Area  
 33.61 kg/m 2 2.05 m 2 Preferred Pharmacy Pharmacy Name Phone [facility] [address] [place] [address] [address] [facility] [phone] Your Updated Medication List  
  
   
This list is accurate as of: 12/27/17  9:43 AM.  Always use your most recent med list.  
  
  
  
  
 Sonia Blake Take  by mouth. bumetanide 2 mg tablet Commonly known as:  Belkys Correa Take 2 mg by mouth daily. NEXIUM 24HR PO Take  by mouth.  
  
 potassium chloride SA 10 mEq capsule Commonly known as:  Derrell Rump Take 10 mEq by mouth two (2) times a day. torsemide 20 mg tablet Commonly known as:  DEMADEX Take  by mouth daily. valACYclovir 500 mg tablet Commonly known as:  VALTREX Take  by mouth two (2) times a day. ZANTAC 150 mg tablet Generic drug:  raNITIdine Take 150 mg by mouth two (2) times a day. Follow-up Instructions  Return if symptoms worsen or fail to improve. Introducing [facility]! Isaak Brandon introduces Ecal patient portal. Now you can access parts of your medical record, email your doctor's office, and request medication refills online. 1. In your internet browser, go to [email or web address] 2. Click on the First Time User? Click Here link in the Sign In box. You will see the New Member Sign Up page. 3. Enter your Ecal Access Code exactly as it appears below. You will not need to use this code after youve completed the sign-up process. If you do not sign up before the expiration date, you must request a new code. · Ecal Access Code: [email or web address] Expires: 1/31/2018  1:13 PM 
 
4. Enter the last four digits of your Social Security Number (xxxx) and Date of Birth (mm/dd/yyyy) as indicated and click Submit. You will be taken to the next sign-up page. 5. Create a Ecal ID. This will be your Ecal login ID and cannot be changed, so think of one that is secure and easy to remember. 6. Create a Ecal password. You can change your password at any time. 7. Enter your Password Reset Question and Answer. This can be used at a later time if you forget your password. 8. Enter your e-mail address. You will receive e-mail notification when new information is available in [address] 9. Click Sign Up. You can now view and download portions of your medical record. 10. Click the Download Summary menu link to download a portable copy of your medical information. If you have questions, please visit the Frequently Asked Questions section of the Ecal website. Remember, Ecal is NOT to be used for urgent needs. For medical emergencies, dial 911. Now available from your iPhone and Android! Please provide this summary of care documentation to your next provider. Your primary care clinician is listed as Kareen Michaud.  If you have any questions after today's visit, please call [phone].

## 2017-12-27 NOTE — PROGRESS NOTES
uDrga Morin is a 62 y.o. female who presents with the following  No chief complaint on file. HPI  Patient comes in for a follow up for numbness and tingling. Seen orthopedics and diagnosed her with bilaterally carpal tunnel and left radiculopathy. She states she is wearing an elbow pad and this is helping her left arm symptoms. Seeing PCP to evaluate sources of fluid, and heavy metals that could be causing her symptoms. Off a few medications and does feel better. Not as sedated. Feels like things are getting better. Allergies   Allergen Reactions    Thimerosal Unknown (comments)       Current Outpatient Prescriptions   Medication Sig    torsemide (DEMADEX) 20 mg tablet Take  by mouth daily.  bumetanide (BUMEX) 2 mg tablet Take 2 mg by mouth daily.  potassium chloride SA (MICRO-K) 10 mEq capsule Take 10 mEq by mouth two (2) times a day.  valACYclovir (VALTREX) 500 mg tablet Take  by mouth two (2) times a day.  ESOMEPRAZOLE MAGNESIUM (NEXIUM 24HR PO) Take  by mouth.  NAPROXEN SODIUM (ALEVE PO) Take  by mouth.  raNITIdine (ZANTAC) 150 mg tablet Take 150 mg by mouth two (2) times a day. No current facility-administered medications for this visit.         History   Smoking Status    Never Smoker   Smokeless Tobacco    Never Used       Past Medical History:   Diagnosis Date    Constipation     Fatigue        Past Surgical History:   Procedure Laterality Date    HX BREAST REDUCTION  2014    HX HYSTERECTOMY  2015       Family History   Problem Relation Age of Onset    Heart Disease Mother     Cancer Father     Stroke Father     Parkinson's Disease Father     Heart Disease Father        Social History     Social History    Marital status:      Spouse name: N/A    Number of children: N/A    Years of education: N/A     Social History Main Topics    Smoking status: Never Smoker    Smokeless tobacco: Never Used    Alcohol use No    Drug use: No    Sexual activity: Not Asked     Other Topics Concern    None     Social History Narrative       ROS    Remainder of comprehensive review is negative. Physical Exam :    Visit Vitals    /80    Pulse (!) 111    Wt 91.6 kg (202 lb)    SpO2 98%    BMI 33.61 kg/m2           Results for orders placed or performed during the hospital encounter of 12/16/14   EKG, 12 LEAD, INITIAL   Result Value Ref Range    Ventricular Rate 87 BPM    Atrial Rate 87 BPM    P-R Interval 156 ms    QRS Duration 78 ms    Q-T Interval 358 ms    QTC Calculation (Bezet) 430 ms    Calculated P Axis 55 degrees    Calculated R Axis 31 degrees    Calculated T Axis 45 degrees    Diagnosis       Normal sinus rhythm  Normal ECG  No previous ECGs available  Confirmed by Vernell Carmen M.D., Linette Esters ([id number]) on 12/16/2014 4:38:19 PM       No orders of the defined types were placed in this encounter. No diagnosis found. Follow-up Disposition:  Return if symptoms worsen or fail to improve. MRI brain and MRI cervical spine were both normal with no causes of her symptoms. We discussed this and she is relieved. She will continue to follow with PCP and orthopedics as we have no other realms to evaluate as her EMG was discussed at earlier appointment. Call with any changes.            This note will not be viewable in Fantom[employer]

## 2021-04-06 ENCOUNTER — HOSPITAL ENCOUNTER (OUTPATIENT)
Dept: GENERAL RADIOLOGY | Age: 61
Discharge: HOME | End: 2021-04-06
Attending: FAMILY MEDICINE
Payer: COMMERCIAL

## 2021-04-06 ENCOUNTER — TRANSCRIBE ORDER (OUTPATIENT)
Dept: GENERAL RADIOLOGY | Age: 61
End: 2021-04-06

## 2021-04-06 DIAGNOSIS — R06.2 WHEEZING: ICD-10-CM

## 2021-04-06 DIAGNOSIS — R05.9 COUGH: Primary | ICD-10-CM

## 2021-04-06 DIAGNOSIS — R05.9 COUGH: ICD-10-CM

## 2021-04-06 PROCEDURE — 71046 X-RAY EXAM CHEST 2 VIEWS: CPT

## 2022-04-19 ENCOUNTER — HOSPITAL ENCOUNTER (OUTPATIENT)
Age: 62
Setting detail: OUTPATIENT SURGERY
Discharge: HOME | End: 2022-04-19
Attending: SPECIALIST | Admitting: SPECIALIST
Payer: COMMERCIAL

## 2022-04-19 VITALS
OXYGEN SATURATION: 98 % | HEIGHT: 65 IN | SYSTOLIC BLOOD PRESSURE: 130 MMHG | TEMPERATURE: 97.2 F | HEART RATE: 64 BPM | WEIGHT: 213.07 LBS | DIASTOLIC BLOOD PRESSURE: 69 MMHG | RESPIRATION RATE: 18 BRPM | BODY MASS INDEX: 35.5 KG/M2

## 2022-04-19 DIAGNOSIS — R07.89 OTHER CHEST PAIN: ICD-10-CM

## 2022-04-19 PROCEDURE — 77030041063 HC CATH DX ANGI DXTRTY MEDT -A: Performed by: SPECIALIST

## 2022-04-19 PROCEDURE — 74011250636 HC RX REV CODE- 250/636: Performed by: SPECIALIST

## 2022-04-19 PROCEDURE — 99152 MOD SED SAME PHYS/QHP 5/>YRS: CPT | Performed by: SPECIALIST

## 2022-04-19 PROCEDURE — 93458 L HRT ARTERY/VENTRICLE ANGIO: CPT | Performed by: SPECIALIST

## 2022-04-19 PROCEDURE — 74011000636 HC RX REV CODE- 636: Performed by: SPECIALIST

## 2022-04-19 PROCEDURE — C1760 CLOSURE DEV, VASC: HCPCS | Performed by: SPECIALIST

## 2022-04-19 PROCEDURE — 77030003390 HC NDL ANGI MRTM -A: Performed by: SPECIALIST

## 2022-04-19 PROCEDURE — 2709999900 HC NON-CHARGEABLE SUPPLY: Performed by: SPECIALIST

## 2022-04-19 PROCEDURE — 74011000250 HC RX REV CODE- 250: Performed by: SPECIALIST

## 2022-04-19 PROCEDURE — C1894 INTRO/SHEATH, NON-LASER: HCPCS | Performed by: SPECIALIST

## 2022-04-19 RX ORDER — MONTELUKAST SODIUM 10 MG/1
10 TABLET ORAL DAILY
COMMUNITY

## 2022-04-19 RX ORDER — FAMOTIDINE 20 MG/1
20 TABLET, FILM COATED ORAL 2 TIMES DAILY
COMMUNITY

## 2022-04-19 RX ORDER — DIPHENHYDRAMINE HYDROCHLORIDE 50 MG/ML
50 INJECTION, SOLUTION INTRAMUSCULAR; INTRAVENOUS ONCE
Status: COMPLETED | OUTPATIENT
Start: 2022-04-19 | End: 2022-04-19

## 2022-04-19 RX ORDER — SODIUM CHLORIDE 0.9 % (FLUSH) 0.9 %
5-40 SYRINGE (ML) INJECTION AS NEEDED
Status: DISCONTINUED | OUTPATIENT
Start: 2022-04-19 | End: 2022-04-19 | Stop reason: HOSPADM

## 2022-04-19 RX ORDER — LIDOCAINE HYDROCHLORIDE 10 MG/ML
INJECTION INFILTRATION; PERINEURAL AS NEEDED
Status: DISCONTINUED | OUTPATIENT
Start: 2022-04-19 | End: 2022-04-19 | Stop reason: HOSPADM

## 2022-04-19 RX ORDER — SOLIFENACIN SUCCINATE 10 MG/1
10 TABLET, FILM COATED ORAL DAILY
COMMUNITY

## 2022-04-19 RX ORDER — MIDAZOLAM HYDROCHLORIDE 1 MG/ML
INJECTION, SOLUTION INTRAMUSCULAR; INTRAVENOUS AS NEEDED
Status: DISCONTINUED | OUTPATIENT
Start: 2022-04-19 | End: 2022-04-19 | Stop reason: HOSPADM

## 2022-04-19 RX ORDER — ASPIRIN 81 MG/1
81 TABLET ORAL DAILY
COMMUNITY

## 2022-04-19 RX ORDER — ACETAMINOPHEN 500 MG
TABLET ORAL DAILY
COMMUNITY

## 2022-04-19 RX ORDER — FLUTICASONE PROPIONATE 50 MCG
2 SPRAY, SUSPENSION (ML) NASAL DAILY
COMMUNITY

## 2022-04-19 RX ORDER — EZETIMIBE 10 MG/1
TABLET ORAL
COMMUNITY

## 2022-04-19 RX ORDER — SODIUM CHLORIDE 9 MG/ML
75 INJECTION, SOLUTION INTRAVENOUS CONTINUOUS
Status: DISCONTINUED | OUTPATIENT
Start: 2022-04-19 | End: 2022-04-19

## 2022-04-19 RX ORDER — DIPHENHYDRAMINE HYDROCHLORIDE 50 MG/ML
25 INJECTION, SOLUTION INTRAMUSCULAR; INTRAVENOUS
Status: DISCONTINUED | OUTPATIENT
Start: 2022-04-19 | End: 2022-04-19 | Stop reason: HOSPADM

## 2022-04-19 RX ORDER — SODIUM CHLORIDE 0.9 % (FLUSH) 0.9 %
5-40 SYRINGE (ML) INJECTION EVERY 8 HOURS
Status: DISCONTINUED | OUTPATIENT
Start: 2022-04-19 | End: 2022-04-19 | Stop reason: HOSPADM

## 2022-04-19 RX ORDER — SODIUM CHLORIDE 9 MG/ML
125 INJECTION, SOLUTION INTRAVENOUS CONTINUOUS
Status: DISCONTINUED | OUTPATIENT
Start: 2022-04-19 | End: 2022-04-19 | Stop reason: HOSPADM

## 2022-04-19 RX ORDER — METOPROLOL TARTRATE 25 MG/1
25 TABLET, FILM COATED ORAL 2 TIMES DAILY
COMMUNITY

## 2022-04-19 RX ORDER — FENTANYL CITRATE 50 UG/ML
INJECTION, SOLUTION INTRAMUSCULAR; INTRAVENOUS AS NEEDED
Status: DISCONTINUED | OUTPATIENT
Start: 2022-04-19 | End: 2022-04-19 | Stop reason: HOSPADM

## 2022-04-19 RX ORDER — FAMOTIDINE 10 MG/ML
INJECTION INTRAVENOUS
Status: DISCONTINUED
Start: 2022-04-19 | End: 2022-04-19 | Stop reason: HOSPADM

## 2022-04-19 RX ORDER — HYDROCORTISONE SODIUM SUCCINATE 100 MG/2ML
100 INJECTION, POWDER, FOR SOLUTION INTRAMUSCULAR; INTRAVENOUS
Status: DISCONTINUED | OUTPATIENT
Start: 2022-04-19 | End: 2022-04-19 | Stop reason: HOSPADM

## 2022-04-19 RX ORDER — FAMOTIDINE 10 MG/ML
20 INJECTION INTRAVENOUS ONCE
Status: DISCONTINUED | OUTPATIENT
Start: 2022-04-19 | End: 2022-04-19

## 2022-04-19 RX ORDER — LOSARTAN POTASSIUM AND HYDROCHLOROTHIAZIDE 25; 100 MG/1; MG/1
1 TABLET ORAL DAILY
COMMUNITY

## 2022-04-19 RX ADMIN — SODIUM CHLORIDE, PRESERVATIVE FREE 20 MG: 5 INJECTION INTRAVENOUS at 13:10

## 2022-04-19 RX ADMIN — DIPHENHYDRAMINE HYDROCHLORIDE 50 MG: 50 INJECTION, SOLUTION INTRAMUSCULAR; INTRAVENOUS at 13:10

## 2022-04-19 RX ADMIN — METHYLPREDNISOLONE SODIUM SUCCINATE 125 MG: 125 INJECTION, POWDER, FOR SOLUTION INTRAMUSCULAR; INTRAVENOUS at 13:10

## 2022-04-19 NOTE — DISCHARGE INSTRUCTIONS
Discharge Instructions:                    Cardiac Catheterization/Angiography Discharge Instructions    *Check the puncture site frequently for swelling or bleeding. If you see any bleeding, lie down and apply pressure over the area with a clean town or washcloth. Notify your doctor for any redness, swelling, drainage or oozing from the puncture site. Notify your doctor for any fever or chills. *If the leg with the puncture becomes cold, numb or painful, call Dr. Abdi Samson at  [phone]. *Activity should be limited for the next 48 hours. Climb stairs as little as possible and avoid any stooping, bending or strenuous activity for 48 hours. No heavy lifting (anything over 10 pounds) for five days. *Do not drive for 48 hours. *You may resume your usual diet. Drink more fluids than usual.    *Have a responsible person drive you home and stay with you for at least 24 hours after your heart catheterization/angiography. *You may remove the bandage from your Right Groin in 24 hours. You may shower in 24 hours. No tub baths, hot tubs or swimming for one week. Do not place any lotions, creams, powders, ointments over the puncture site for one week. You may place a clean band-aid over the puncture site each day for 5 days. Change this daily. Activity:     As tolerated except do not lift over 10 pounds for 5 days. Diet:     American Heart Association. Follow-up:     Follow up with Tati Gardner MD on April 28th, 2022 at 9:40am.  [address] [place] [address]  [phone]      If you smoke, STOP!

## 2022-04-19 NOTE — PROGRESS NOTES
3:00 PM  Assumed care of patient. R maddiein site, [facility].    4:04 PM  Discharge instructions reviewed with patient and family. Voiced understanding. Patient given copy of discharge instructions to take home. 4:35 PM  Pt discharged via wheelchair with . Personal belongings with patient upon discharge.

## 2022-04-19 NOTE — H&P
Date of Surgery Update:  Osvaldo Encarnacion was seen and examined. History and physical has been reviewed. The patient has been examined. There have been no significant clinical changes since the completion of the originally dated History and Physical.    Signed By: Sweetie Brewer MD     April 19, 2022 1:06 PM         +CP  EBT (4/12/22) = 55  Echo (4/15/22):  EF 60-65%. ETT (4/15/22): +CP, - EKG        Lei Amaro 1960   Office/Outpatient Visit  Visit Date: Fri, Apr 15, 2022 2:20 pm  Provider: Saurabh Ibanez MD (Assistant: Zohaib Mallory )  Location: [facility])[address] [place]. [address] [phone]    Electronically signed by Elieser Ashby MD on  04/18/2022 08:06:48 AM                           Subjective:    CC: NO EKG todayMsJenifer Arellano is a 64year old White female. Her primary care physician is Maco Orozco MD.  She is here to follow up with the doctor regarding previous testing, Carotid Doppler - 04/15/2022, echocardiogram - 04/15/2022, treadmill stress test - 04/15/2022, EBT Calcium Scoring. She presents today with a complaint of chest pain. Patient verbalized medications unchanged since last office visit. She has a history of hypercholesterolemia and essential hypertension. HPI:           Hypercholesterolemia: Current treatment includes Zetia. Regarding hypertension:  MD Notes: Her father had a heart attack with stents in his 46s. Her mother had a heart attack at 80. Her younger brother had stents placed. Her niece had VT with cardiac arrest.  Type Primary Hypertension Stress Echo: 11/18/2020 There was no ECG evidence of ischemia. No ischemia noted on echocardiogram, normal pre & post global wall motion. The patient did not experience chest discomfort. Normal blood pressure response to exercise. Hypertensive response to exercise. Poor exercise capacity. There is trace mitral regurgitation.  There is trace tricuspid regurgitation. 4/14/2022 Treadmill - There was no ECG evidence of ischemia. The patient experienced chest discomfort during stress testing. Normal  blood pressure response to exercise. Normal heart rate response to exercise. Poor exercise capacity. 4/15/2022 ECHO Normal LV systolic function with an estimated ejection fraction of 60-65%. There is mild left ventricular hypertrophy. The left atrium is mildly enlarged. There is trace mitral regurgitation. There is trace tricuspid regurgitation. 4/15/2022 Carotid Doppler - Mild plaques involving the carotid system. Mild calcified plaques in the right carotid bulb extending to the internal carotid artery consistent with less than 50% stenosis. Mild calcified plaques in the left carotid bulb. 4/12/2022 - EBT = 55 Current symptoms include chest pain and shortness of breath. She denies lower extremity edema. Currently, her treatment regimen consists of an ACE/ARB ( Losartan ),  Zetia, and a diuretic ( hydrochlorothiazide ). Regarding dyspnea/shortness of breath: This tends to be worse with exertion. The shortness of breath is better rest.            Regarding chest pain:   The discomfort is located primarily in the center of the chest.  It radiates to the mid-chest.  She characterizes the pain as pressure and burning. It seems to be worse with exertion. Pain improves with rest.  Associated symptoms include dyspnea. Coronary Artery Disease: Current symptoms include angina, chest pain and shortness of breath. MD Notes: Patient developed severe chest pain during the stress test.  Abnormal result of cardiovascular function study, unspecified noted. ROS:   GENERAL:  Denies recent weight gain or weight loss. EYES:  Denies double vision. ENT:  Denies tinnitus. No nose bleeding. ENDOCRINE:  Negative for temperature intolerances and excessive sweating. CARDIOVASCULAR:  Please see HPI. RESPIRATORY:  Positive for dyspnea. GI:  No nausea, vomiting, hematemesis, diarrhea or melena. :  No dysuria, polyuria or hematuria. HEMATOLOGIC/LYMPHATIC:  Negative for easy bruising and excessive bleeding. NEUROLOGICAL:  Negative for seizures and vertigo. Past Medical History / Family History / Social History:     Last Reviewed on 4/15/2022 02:36 PM by Malcolm Weiner  Past Medical History:     Hyperlipidemia  Hypertension   Covid 2/2020     Past Cardiac Procedures:  Stress Echo:  11/18/2020 There was no ECG evidence of ischemia. No ischemia noted on echocardiogram, normal pre & post global wall motion. The patient did not experience chest discomfort. Normal  blood pressure response to exercise. Hypertensive response to exercise. Poor exercise capacity. There is trace mitral regurgitation. There is trace tricuspid regurgitation. Calcium score(EBT)= 55  (04/12/22)     Surgical History:   Surgical/Procedural History:   Hysterectomy  Breast reduction  Foot surgery     Family History:   Father:  Positive for Arrhythmia ( afib ) and Coronary Artery Disease; Mother:  Positive for Arrhythmia ( afib ) and Myocardial Infarction;   Brother(s):  Positive for Arrhythmia ( afib ), Coronary Artery Disease and Myocardial Infarction; Niece - cardiac arrest, defib & pacemaker- syncope -at age 13     Social History:   Social History:   Occupation: [employer]; Children: 4 children     Tobacco/Alcohol/Supplements:   Last Reviewed on 4/15/2022 02:36 PM by Nikunj CHONG  TOBACCO/ALCOHOL/SUPPLEMENTS   Tobacco: She has never smoked. Alcohol: Drinks approximately 2 times per month. Caffeine:  She admits to consuming caffeine via coffee ( 1 serving per day ) and tea ( 1 serving per day ).       Substance Abuse History:   Last Reviewed on 4/15/2022 02:36 PM by Nikunj CHONG  Substance Use/Abuse:   None     Mental Health History:   Last Reviewed on 4/15/2022 02:36 PM by Mc Jones (eg STDs): Last Reviewed on 4/15/2022 02:36 PM by Jenna CHONG    Current Problems:   Last Reviewed on 4/15/2022 02:36 PM by Jenna CHONG  Epistaxis                              Neuralgia, postherpetic  Candida infection  Vitamin D deficiency  Obesity (BMI 30.0-34. 9)  MTHFR gene mutation  Hypercholesterolemia  Moderate obstructive sleep apnea  Carpal tunnel syndrome, unspecified laterality  Sciatic leg pain  Postmenopausal atrophic vaginitis  Bleeding nose  Microhematuria  Chronic fatigue  BMI 32.0-32.9,adult  BMI 33.0-33.9,adult  History of hysterectomy  Chest pain, unspecified  Other chest pain  Essential (primary) hypertension  Pure hypercholesterolemia  Pure hypercholesterolemia  Dyspnea, unspecified  Cardiac murmur, unspecified  Encounter for screening for cardiovascular disorders  Pure hyperglyceridemia  Hypercholesterolemia  Essential hypertension, benign  Screening for cardiovascular conditions    Current Medications:   Last Reviewed on 4/15/2022 02:36 PM by Jenna CHONG  losartan-hydrochlorothiazide 100-25 mg oral tablet [take 1 tablet by oral route once daily]  aspirin 81 mg oral tablet, delayed release (enteric coated) [take 1 tablet (81 mg) by oral route once daily with food]  potassium chloride 10 mEq oral tablet, extended release [take 1 tablet (10 meq) by oral route once daily with food]  solifenacin 10 mg oral tablet [take 1 tablet (10 mg) by oral route once daily]  Estrogen/progesterone cream   testosterone  [0.5% (5mg/mL) topical ]  famotidine 20 mg oral tablet [take 1 tablet (20 mg) by oral route once daily at bedtime]  ezetimibe 10 mg oral tablet [1 po qd ]  lansoprazole 30 mg oral capsule,delayed release (enteric coated) [Lansoprazole 30 MG]  fluconazole 200 mg oral tablet [Fluconazole 200 MG]  montelukast 10 mg oral tablet [Montelukast Sodium 10 mg]  esomeprazole magnesium 40 mg oral capsule,delayed release (enteric coated) [Esomeprazole Magnesium 40 MG]    Objective:    Vitals: Historical:   4/1/2022  BP:   140/69 mm Hg ( (left arm, , standing, );) 4/1/2022  BP:   140/65 mm Hg ( (left arm, , sitting, );) 4/1/2022  Wt:   214lbs  Current: 4/15/2022 2:35:58 PM  Ht:  5 ft, 5 in; Wt: 215 lbs;  BMI: 35. 8BP: 128/83 mm Hg (left arm, sitting);  P: 81 bpm    Repeat:   2:36:37 PM  BP:   146/93mm Hg (left arm, standing)   Exams:   GENERAL:  Alert, oriented to person, place and time. HEENT:  Pinkish palpebral  conjunctivae. Anicteric sclerae. Neck: Bilateral carotid bruit   CHEST: Equal expansion. Clear breath sounds. No rales, no wheezing. Heart: Reg rate and rhythm. Grade 2/6 systolic ejection murmur at the aortic area radiating to the neck. ABDOMEN:  Soft. Normal active bowel sounds. No tenderness. EXTREMITIES:  No pitting pedal edema. Equal pulses bilaterally. NEURO:  Grossly intact.       Assessment:     E78.0   Pure hypercholesterolemia     I10   Essential (primary) hypertension     E78.00   Pure hypercholesterolemia     I10   Essential (primary) hypertension     R06.00   Dyspnea, unspecified       R06.89 Other abnormalities of [id number]   Cardiac murmur, unspecified     R07.9   Chest pain, unspecified     R07.89   Other chest pain     I25.118   Atherosclerotic heart disease of native coronary artery with other forms of angina pectoris     I25.1   Atherosclerotic heart disease of native coronary artery     I25.11   Atherosclerotic heart disease of native coronary artery with angina pectoris     R07.89   Other chest pain     R94.30   Abnormal result of cardiovascular function study, unspecified       ORDERS:     Procedures Ordered:     68349  Education and train for pt self-mgmt by qualified, nonphysician, ea 30 minutes; individual pt  (Send-Out)          28919  EBT, heart, w/o contrast material, w/quant eval of coronary calcium  (Send-Out)          XCD  Carotid Doppler  (In-House)          XTST  Treadmill Stress Test  (In-House)          XECD  Echocardiogram (In-House)          27352  Education and train for pt self-mgmt by qualified, nonphysician, nino 30 minutes; individual pt  (Send-Out)          [facility]  Cardiac Cath  (In-House)          Other Orders:     [id number]  Queried Patient for Tobacco Use  (Send-Out)          NW982C  Queried Patient for Tobacco Use  (Send-Out)              Plan:     Essential (primary) hypertension  1. Medication list has been reviewed. Start the following medication(s):  Metoprolol. Smoking Status:  Nonsmoker   2. Advised the patient regarding diet, exercise, and lifestyle modification. 3.  The patient to call the office if there is any change in her cardiac symptoms. 4.  Explained to the patient the importance of controlling her cardiac risk factors. Testing/Procedures: Cardiac Catheterization  Explained to the patient the indication, procedure, risks, and benefits of cardiac catheterization. The patient understands  and wishes to proceed with the cath to be performed as an outpatient at [facility] by Dr. Lillian Abad. Schedule a follow up appointment in 2 weeks. I also recommend that you monitor your BP. Target BP less than 130/80. The above note was transcribed by Roberto Rios RN and authenticated by Dr. Lex Alcantara prior to sign off.         Orders:     [id number]  Queried Patient for Tobacco Use  (Send-Out)          86021  Education and train for pt self-mgmt by qualified, nonphysiciannino 30 minutes; individual pt  (Send-Out)          [facility]  Cardiac Cath  (In-House)            Other Orders    [id number]  Queried Patient for Tobacco Use  (Send-Out)          17271  Education and train for pt self-mgmt by qualified, nonphysiciannino 30 minutes; individual pt  (Send-Out)          [phone]  EBT, heart, w/o contrast material, w/quant eval of coronary calcium  (Send-Out)          XCD  Carotid Doppler  (In-House)          XTST  Treadmill Stress Test  (In-House)          XECD  Echocardiogram  (In-House) Patient Recommendations: For  Essential (primary) hypertension:    1. Your medication list has been reviewed. Start the following medication(s):  Metoprolol. 2.  You have been advised regarding diet, exercise, and lifestyle. modification. 3.  Please call the office if there is any change in your cardiac symptoms. 4.  Explained to you the importance of controlling your cardiac risk factors. You need to have the following test/procedure(s) done: Cardiac Catheterization:  The patient understands and wishes to proceed with the cath to be performed as an outpatient at [facility] by Dr. Ray Liu. Schedule a follow-up visit in 2 weeks. I also recommend that you monitor your BP. Target BP less than 130/80.

## 2022-04-19 NOTE — PROGRESS NOTES
12:40 PM    Patient arrived. ID and allergies verified verbally with patient. Pt voices understanding of procedure to be performed. Consent obtained. Pt prepped for procedure. 2:26 PM    TRANSFER - IN REPORT:    Verbal report received from Ed RN (name) on Veto Come  being received from Cath Lab (unit) for routine post - op      Report consisted of patients Situation, Background, Assessment and   Recommendations(SBAR). Information from the following report(s) Procedure Summary was reviewed with the receiving nurse. Opportunity for questions and clarification was provided. Assessment completed upon patients arrival to unit and care assumed.

## 2022-04-19 NOTE — PROCEDURES
Cath:  Minimal (non-obstructive) CAD:     LAD patent     RI patent     LCx patent     RCA p30  Normal LVF (EF 60%).   No AVG/MR  RFA vascade    F/U with Dr. Juno Dangelo 4/28/22 @ 9:40am.

## 2024-07-10 ENCOUNTER — HOSPITAL ENCOUNTER (EMERGENCY)
Facility: HOSPITAL | Age: 64
Discharge: HOME | End: 2024-07-10
Attending: EMERGENCY MEDICINE
Payer: COMMERCIAL

## 2024-07-10 ENCOUNTER — APPOINTMENT (OUTPATIENT)
Facility: HOSPITAL | Age: 64
End: 2024-07-10
Attending: EMERGENCY MEDICINE
Payer: COMMERCIAL

## 2024-07-10 VITALS
BODY MASS INDEX: 33.68 KG/M2 | SYSTOLIC BLOOD PRESSURE: 129 MMHG | RESPIRATION RATE: 16 BRPM | WEIGHT: 202.38 LBS | TEMPERATURE: 97.9 F | HEART RATE: 73 BPM | OXYGEN SATURATION: 96 % | DIASTOLIC BLOOD PRESSURE: 80 MMHG

## 2024-07-10 DIAGNOSIS — R06.02 SHORTNESS OF BREATH: ICD-10-CM

## 2024-07-10 DIAGNOSIS — R07.9 CHEST PAIN, UNSPECIFIED TYPE: Primary | ICD-10-CM

## 2024-07-10 LAB
ALBUMIN SERPL-MCNC: 3.5 G/DL (ref 3.5–5)
ALBUMIN/GLOB SERPL: 1.1 (ref 1.1–2.2)
ALP SERPL-CCNC: 71 U/L (ref 45–117)
ALT SERPL-CCNC: 20 U/L (ref 12–78)
ANION GAP SERPL CALC-SCNC: 9 MMOL/L (ref 5–15)
AST SERPL-CCNC: 22 U/L (ref 15–37)
BASOPHILS # BLD: 0 K/UL (ref 0–0.1)
BASOPHILS NFR BLD: 1 % (ref 0–1)
BILIRUB SERPL-MCNC: 0.4 MG/DL (ref 0.2–1)
BUN SERPL-MCNC: 15 MG/DL (ref 6–20)
BUN/CREAT SERPL: 14 (ref 12–20)
CALCIUM SERPL-MCNC: 8.6 MG/DL (ref 8.5–10.1)
CHLORIDE SERPL-SCNC: 101 MMOL/L (ref 97–108)
CO2 SERPL-SCNC: 29 MMOL/L (ref 21–32)
CREAT SERPL-MCNC: 1.05 MG/DL (ref 0.55–1.02)
D DIMER PPP FEU-MCNC: 0.49 MG/L FEU (ref 0–0.65)
DIFFERENTIAL METHOD BLD: NORMAL
EKG ATRIAL RATE: 85 BPM
EKG DIAGNOSIS: NORMAL
EKG P AXIS: -7 DEGREES
EKG P-R INTERVAL: 180 MS
EKG Q-T INTERVAL: 376 MS
EKG QRS DURATION: 76 MS
EKG QTC CALCULATION (BAZETT): 447 MS
EKG R AXIS: 0 DEGREES
EKG T AXIS: 24 DEGREES
EKG VENTRICULAR RATE: 85 BPM
EOSINOPHIL # BLD: 0.2 K/UL (ref 0–0.4)
EOSINOPHIL NFR BLD: 3 % (ref 0–7)
ERYTHROCYTE [DISTWIDTH] IN BLOOD BY AUTOMATED COUNT: 12.8 % (ref 11.5–14.5)
FLUAV RNA SPEC QL NAA+PROBE: NOT DETECTED
FLUBV RNA SPEC QL NAA+PROBE: NOT DETECTED
GLOBULIN SER CALC-MCNC: 3.3 G/DL (ref 2–4)
GLUCOSE SERPL-MCNC: 109 MG/DL (ref 65–100)
HCT VFR BLD AUTO: 40.3 % (ref 35–47)
HGB BLD-MCNC: 14 G/DL (ref 11.5–16)
IMM GRANULOCYTES # BLD AUTO: 0 K/UL (ref 0–0.04)
IMM GRANULOCYTES NFR BLD AUTO: 0 % (ref 0–0.5)
LYMPHOCYTES # BLD: 1.2 K/UL (ref 0.8–3.5)
LYMPHOCYTES NFR BLD: 19 % (ref 12–49)
MCH RBC QN AUTO: 30.6 PG (ref 26–34)
MCHC RBC AUTO-ENTMCNC: 34.7 G/DL (ref 30–36.5)
MCV RBC AUTO: 88 FL (ref 80–99)
MONOCYTES # BLD: 0.5 K/UL (ref 0–1)
MONOCYTES NFR BLD: 8 % (ref 5–13)
NEUTS SEG # BLD: 4.4 K/UL (ref 1.8–8)
NEUTS SEG NFR BLD: 69 % (ref 32–75)
NRBC # BLD: 0 K/UL (ref 0–0.01)
NRBC BLD-RTO: 0 PER 100 WBC
PLATELET # BLD AUTO: 187 K/UL (ref 150–400)
PMV BLD AUTO: 11.3 FL (ref 8.9–12.9)
POTASSIUM SERPL-SCNC: 3.4 MMOL/L (ref 3.5–5.1)
PROT SERPL-MCNC: 6.8 G/DL (ref 6.4–8.2)
RBC # BLD AUTO: 4.58 M/UL (ref 3.8–5.2)
SARS-COV-2 RNA RESP QL NAA+PROBE: NOT DETECTED
SODIUM SERPL-SCNC: 139 MMOL/L (ref 136–145)
TROPONIN I SERPL HS-MCNC: 4 NG/L (ref 0–51)
TROPONIN I SERPL HS-MCNC: 5 NG/L (ref 0–51)
WBC # BLD AUTO: 6.3 K/UL (ref 3.6–11)

## 2024-07-10 PROCEDURE — 80053 COMPREHEN METABOLIC PANEL: CPT

## 2024-07-10 PROCEDURE — 85025 COMPLETE CBC W/AUTO DIFF WBC: CPT

## 2024-07-10 PROCEDURE — 99285 EMERGENCY DEPT VISIT HI MDM: CPT

## 2024-07-10 PROCEDURE — 71046 X-RAY EXAM CHEST 2 VIEWS: CPT

## 2024-07-10 PROCEDURE — 6370000000 HC RX 637 (ALT 250 FOR IP): Performed by: PHYSICIAN ASSISTANT

## 2024-07-10 PROCEDURE — 84484 ASSAY OF TROPONIN QUANT: CPT

## 2024-07-10 PROCEDURE — 36415 COLL VENOUS BLD VENIPUNCTURE: CPT

## 2024-07-10 PROCEDURE — 93010 ELECTROCARDIOGRAM REPORT: CPT | Performed by: INTERNAL MEDICINE

## 2024-07-10 PROCEDURE — 93005 ELECTROCARDIOGRAM TRACING: CPT | Performed by: EMERGENCY MEDICINE

## 2024-07-10 PROCEDURE — 2580000003 HC RX 258: Performed by: PHYSICIAN ASSISTANT

## 2024-07-10 PROCEDURE — 87636 SARSCOV2 & INF A&B AMP PRB: CPT

## 2024-07-10 PROCEDURE — 85379 FIBRIN DEGRADATION QUANT: CPT

## 2024-07-10 PROCEDURE — 96360 HYDRATION IV INFUSION INIT: CPT

## 2024-07-10 RX ORDER — ASPIRIN 325 MG
325 TABLET ORAL
Status: COMPLETED | OUTPATIENT
Start: 2024-07-10 | End: 2024-07-10

## 2024-07-10 RX ORDER — SEMAGLUTIDE 0.5 MG/.5ML
0.5 INJECTION, SOLUTION SUBCUTANEOUS
COMMUNITY

## 2024-07-10 RX ORDER — 0.9 % SODIUM CHLORIDE 0.9 %
1000 INTRAVENOUS SOLUTION INTRAVENOUS ONCE
Status: COMPLETED | OUTPATIENT
Start: 2024-07-10 | End: 2024-07-10

## 2024-07-10 RX ADMIN — ASPIRIN 325 MG: 325 TABLET ORAL at 15:29

## 2024-07-10 RX ADMIN — SODIUM CHLORIDE 1000 ML: 9 INJECTION, SOLUTION INTRAVENOUS at 16:03

## 2024-07-10 ASSESSMENT — HEART SCORE: ECG: NORMAL

## 2024-07-10 ASSESSMENT — LIFESTYLE VARIABLES
HOW MANY STANDARD DRINKS CONTAINING ALCOHOL DO YOU HAVE ON A TYPICAL DAY: PATIENT DOES NOT DRINK
HOW OFTEN DO YOU HAVE A DRINK CONTAINING ALCOHOL: NEVER

## 2024-07-10 ASSESSMENT — ENCOUNTER SYMPTOMS: SHORTNESS OF BREATH: 1

## 2024-07-10 NOTE — ED TRIAGE NOTES
Pt ambulated to the treatment area with a steady gait. Pt states \" we just flew in last night, I have had chest pain intermittently since July 4th especially when I walk. With tingling in my left arm and it goes numb also shortness of breath. I had a lot of anxiety before I left so I thought that's what it was.\" Pt appears in no distress at this time.

## 2024-07-10 NOTE — ED PROVIDER NOTES
[facility] EM[facility]  EMERGENCY DEPARTMENT ENCOUNTER      Pt Name: Yanet Amos  MRN: [id number]  Birthdate 1960  Date of evaluation: 7/10/2024  Provider: Isac Li PA-C    CHIEF COMPLAINT       Chief Complaint   Patient presents with    Chest Pain    Shortness of Breath         HISTORY OF PRESENT ILLNESS   (Location/Symptom, Timing/Onset, Context/Setting, Quality, Duration, Modifying Factors, Severity)  Note limiting factors.   63-year-old F presenting to the ED for right-sided chest pain with shortness of breath that occurs when walking, going up or down stairs.  Patient reports that symptoms will last for a couple of minutes, but resolves with sitting down and resting.  The symptoms have been going on for the past week, which she thought was related to acid reflux, but reports concern when symptoms lasted longer today.  Pt reports history of anxiety, so she was not sure if this may be cause.    Patient reports that she has a history of acid reflux, so she thought symptoms might have been related to that.  Pt reports that the tingling in left arm is something that has been going on for the past couple of months, and she has slept on it, and goes away. Patient is asymptomatic at this time laying in ED bed.             Review of External Medical Records:     Nursing Notes were reviewed.    REVIEW OF SYSTEMS    (2-9 systems for level 4, 10 or more for level 5)     Review of Systems   Respiratory:  Positive for shortness of breath.    Cardiovascular:  Positive for chest pain.   All other systems reviewed and are negative.      Except as noted above the remainder of the review of systems was reviewed and negative.       PAST MEDICAL HISTORY     Past Medical History:   Diagnosis Date    Constipation     Fatigue     Hyperlipidemia     Hypertension     Panic attack          SURGICAL HISTORY       Past Surgical History:   Procedure Laterality Date    BREAST REDUCTION SURGERY  2014    HYSTERECTOMY (CERVIX STATUS

## 2024-07-11 ENCOUNTER — HOSPITAL ENCOUNTER (EMERGENCY)
Facility: HOSPITAL | Age: 64
Discharge: HOME | End: 2024-07-11
Attending: EMERGENCY MEDICINE
Payer: COMMERCIAL

## 2024-07-11 VITALS
BODY MASS INDEX: 33.98 KG/M2 | TEMPERATURE: 98.2 F | DIASTOLIC BLOOD PRESSURE: 72 MMHG | HEIGHT: 65 IN | HEART RATE: 67 BPM | SYSTOLIC BLOOD PRESSURE: 111 MMHG | WEIGHT: 203.93 LBS | RESPIRATION RATE: 21 BRPM | OXYGEN SATURATION: 97 %

## 2024-07-11 DIAGNOSIS — R07.9 CHEST PAIN, UNSPECIFIED TYPE: Primary | ICD-10-CM

## 2024-07-11 LAB
ALBUMIN SERPL-MCNC: 3.6 G/DL (ref 3.5–5)
ALBUMIN/GLOB SERPL: 1.1 (ref 1.1–2.2)
ALP SERPL-CCNC: 67 U/L (ref 45–117)
ALT SERPL-CCNC: 17 U/L (ref 12–78)
ANION GAP SERPL CALC-SCNC: 6 MMOL/L (ref 5–15)
AST SERPL-CCNC: 16 U/L (ref 15–37)
BASOPHILS # BLD: 0 K/UL (ref 0–0.1)
BASOPHILS NFR BLD: 1 % (ref 0–1)
BILIRUB SERPL-MCNC: 0.4 MG/DL (ref 0.2–1)
BUN SERPL-MCNC: 12 MG/DL (ref 6–20)
BUN/CREAT SERPL: 13 (ref 12–20)
CALCIUM SERPL-MCNC: 8.7 MG/DL (ref 8.5–10.1)
CHLORIDE SERPL-SCNC: 103 MMOL/L (ref 97–108)
CO2 SERPL-SCNC: 30 MMOL/L (ref 21–32)
COMMENT:: NORMAL
CREAT SERPL-MCNC: 0.96 MG/DL (ref 0.55–1.02)
DIFFERENTIAL METHOD BLD: NORMAL
EOSINOPHIL # BLD: 0.2 K/UL (ref 0–0.4)
EOSINOPHIL NFR BLD: 3 % (ref 0–7)
ERYTHROCYTE [DISTWIDTH] IN BLOOD BY AUTOMATED COUNT: 12.7 % (ref 11.5–14.5)
GLOBULIN SER CALC-MCNC: 3.2 G/DL (ref 2–4)
GLUCOSE SERPL-MCNC: 94 MG/DL (ref 65–100)
HCT VFR BLD AUTO: 38.4 % (ref 35–47)
HGB BLD-MCNC: 13.3 G/DL (ref 11.5–16)
IMM GRANULOCYTES # BLD AUTO: 0 K/UL (ref 0–0.04)
IMM GRANULOCYTES NFR BLD AUTO: 0 % (ref 0–0.5)
LYMPHOCYTES # BLD: 1.4 K/UL (ref 0.8–3.5)
LYMPHOCYTES NFR BLD: 24 % (ref 12–49)
MCH RBC QN AUTO: 30.8 PG (ref 26–34)
MCHC RBC AUTO-ENTMCNC: 34.6 G/DL (ref 30–36.5)
MCV RBC AUTO: 88.9 FL (ref 80–99)
MONOCYTES # BLD: 0.4 K/UL (ref 0–1)
MONOCYTES NFR BLD: 7 % (ref 5–13)
NEUTS SEG # BLD: 4 K/UL (ref 1.8–8)
NEUTS SEG NFR BLD: 65 % (ref 32–75)
NRBC # BLD: 0 K/UL (ref 0–0.01)
NRBC BLD-RTO: 0 PER 100 WBC
PLATELET # BLD AUTO: 204 K/UL (ref 150–400)
PMV BLD AUTO: 11.3 FL (ref 8.9–12.9)
POTASSIUM SERPL-SCNC: 3.2 MMOL/L (ref 3.5–5.1)
PROT SERPL-MCNC: 6.8 G/DL (ref 6.4–8.2)
RBC # BLD AUTO: 4.32 M/UL (ref 3.8–5.2)
SODIUM SERPL-SCNC: 139 MMOL/L (ref 136–145)
SPECIMEN HOLD: NORMAL
TROPONIN I SERPL HS-MCNC: 5 NG/L (ref 0–51)
WBC # BLD AUTO: 6 K/UL (ref 3.6–11)

## 2024-07-11 PROCEDURE — 84484 ASSAY OF TROPONIN QUANT: CPT

## 2024-07-11 PROCEDURE — 99284 EMERGENCY DEPT VISIT MOD MDM: CPT

## 2024-07-11 PROCEDURE — 85025 COMPLETE CBC W/AUTO DIFF WBC: CPT

## 2024-07-11 PROCEDURE — 80053 COMPREHEN METABOLIC PANEL: CPT

## 2024-07-11 PROCEDURE — 93005 ELECTROCARDIOGRAM TRACING: CPT | Performed by: EMERGENCY MEDICINE

## 2024-07-11 PROCEDURE — 36415 COLL VENOUS BLD VENIPUNCTURE: CPT

## 2024-07-11 RX ORDER — FEZOLINETANT 45 MG/1
1 TABLET, FILM COATED ORAL DAILY
COMMUNITY
Start: 2024-06-16

## 2024-07-11 RX ORDER — DOCUSATE SODIUM 100 MG/1
100 CAPSULE, LIQUID FILLED ORAL 2 TIMES DAILY
COMMUNITY

## 2024-07-11 RX ORDER — TRAZODONE HYDROCHLORIDE 50 MG/1
50 TABLET ORAL DAILY
COMMUNITY
Start: 2024-06-24

## 2024-07-11 RX ORDER — ESOMEPRAZOLE MAGNESIUM 40 MG/1
40 GRANULE, DELAYED RELEASE ORAL DAILY
COMMUNITY

## 2024-07-11 RX ORDER — NITROFURANTOIN MACROCRYSTALS 50 MG/1
50 CAPSULE ORAL DAILY
COMMUNITY
Start: 2024-05-14

## 2024-07-11 ASSESSMENT — PAIN DESCRIPTION - LOCATION: LOCATION: CHEST

## 2024-07-11 ASSESSMENT — PAIN SCALES - GENERAL: PAINLEVEL_OUTOF10: 0

## 2024-07-11 NOTE — ED NOTES
Patient stable at time of discharge. Reviewed discharge instructions and follow up with home care. Allowed time for questions. Patient verbalized understanding. Ambulatory out of department with steady gait unaccompanied.

## 2024-07-11 NOTE — ED TRIAGE NOTES
Pt c/o SOB and CP with exertion since July 4th. +intermittent LUE n/t. Pt seen for same last night. Pt states that her \"chest burns\" with activity.

## 2024-07-11 NOTE — DISCHARGE INSTRUCTIONS
Though your workup today did not show any signs of a heart attack, heart failure, abnormal rhythm, it is very important for you to have close follow-up with your cardiologist within the next 24 hours for further evaluation.  Keep your appointment with your cardiologist tomorrow.  Return immediately if any new or worsening symptoms as we discussed, chest pain, shortness of breath, nausea, sweating, arm pain, any new or worsening symptoms.  Thank you for allowing us to be part of your care.

## 2024-07-11 NOTE — ED PROVIDER NOTES
[facility] EMERGENCY CTR  EMERGENCY DEPARTMENT ENCOUNTER      Pt Name: Yanet Amos  MRN: [id number]  Birthdate 1960  Date of evaluation: 7/11/2024  Provider: Gino Rader MD    CHIEF COMPLAINT       Chief Complaint   Patient presents with    Shortness of Breath    Chest Pain         HISTORY OF PRESENT ILLNESS   (Location/Symptom, Timing/Onset, Context/Setting, Quality, Duration, Modifying Factors, Severity)  Note limiting factors.   63-year-old female with PMHx of hypertension, panic attack, and chronic dyspnea since COVID last year presents to the emergency department c/o SOB and CP x 1 week.  Per medical records, patient was seen here yesterday for similar symptoms and was offered admission at that time which she did not feel was necessary.  Patient's workup was overall reassuring, including relatively normal EKG, chest x-ray, troponin and D-dimer.  She is returning today for another evaluation of the same symptoms.  She has no additional complaints at this time    The history is provided by the patient.         Review of External Medical Records:     Nursing Notes were reviewed.    REVIEW OF SYSTEMS    (2-9 systems for level 4, 10 or more for level 5)     Review of Systems   Constitutional: Negative.    HENT: Negative.     Eyes: Negative.    Respiratory:  Positive for shortness of breath.    Cardiovascular:  Positive for chest pain.   Gastrointestinal: Negative.    Genitourinary: Negative.    Musculoskeletal: Negative.    Skin: Negative.    Neurological: Negative.    Psychiatric/Behavioral: Negative.         Except as noted above the remainder of the review of systems was reviewed and negative.       PAST MEDICAL HISTORY     Past Medical History:   Diagnosis Date    Constipation     Fatigue     Hyperlipidemia     Hypertension     Panic attack          SURGICAL HISTORY       Past Surgical History:   Procedure Laterality Date    BREAST REDUCTION SURGERY  2014    HYSTERECTOMY (CERVIX STATUS UNKNOWN)  2015

## 2024-07-11 NOTE — DISCHARGE INSTRUCTIONS
You were seen in the emergency department for chest pain and shortness of breath.  The results of your tests were reassuring.  Please take any medications prescribed at this visit as instructed.  Please follow-up with your PCP and cardiologist or return to the emergency department if you experience a worsening of symptoms or any new symptoms that are concerning to you.

## 2024-07-12 LAB
EKG ATRIAL RATE: 69 BPM
EKG DIAGNOSIS: NORMAL
EKG P AXIS: -10 DEGREES
EKG P-R INTERVAL: 184 MS
EKG Q-T INTERVAL: 412 MS
EKG QRS DURATION: 88 MS
EKG QTC CALCULATION (BAZETT): 441 MS
EKG R AXIS: 6 DEGREES
EKG T AXIS: 10 DEGREES
EKG VENTRICULAR RATE: 69 BPM

## 2024-07-12 ASSESSMENT — ENCOUNTER SYMPTOMS
SHORTNESS OF BREATH: 1
EYES NEGATIVE: 1
GASTROINTESTINAL NEGATIVE: 1

## 2024-10-09 NOTE — Clinical Note
Bilateral groin clipped prepped with ChloraPrep and draped.
Contrast: Isovue. Contrast concentration: 370. Amount: 105 mL.
Diagnostic catheter inserted. Catheter used: Left 4.
Diagnostic catheter inserted. Catheter used: Pigtail (angled).
Diagnostic catheter inserted. Catheter used: Right 4.
Diagnostic catheter removed. Catheter used: Left 4.
Diagnostic catheter removed. Catheter used: Pigtail (angled).
Diagnostic catheter removed. Catheter used: Right 4.
Diagnostic wire inserted.   Wire type: In-Q.
Diagnostic wire removed.   Wire type: In-Q.
History and physical documented and up to date, allergies reviewed, lab results reviewed, pre-procedure education provided, patient verbalized understanding of procedure, procedural consent signed and patient is NPO.
ID band present and verified. Family is in the waiting area.
MANUAL GROIN PRESSURE 7 MIN
Multiple views of the left main, left coronary artery, LAD and circumflex artery obtained using hand injection.
Multiple views of the right coronary artery obtained using hand injection.
No specimen collected. Estimated Blood Loss: <30 mL.
PAIN BEING ASSESSED BY CIRCULATING GINA BUI. See Media Tab.
Patient's Left arm IV was occluded upon flushing Fentanyl. IV was removed.
Physician has arrived.
Right femoral artery. Accessed successfully. Femoral access needle used.
Sheath #1: Closed using Vascade. Site secured by Tegaderm.
Sheath #1: Sheath: inserted. Sheath inserted/placed in the right femoral  artery. Hemostasis achieved.
Single view of the right femoral artery obtained using hand injection.
TRANSFER - IN REPORT:     Verbal report received from: Micael Habermann. Report consisted of patient's Situation, Background, Assessment and   Recommendations(SBAR). Opportunity for questions and clarification was provided. Assessment completed upon patient's arrival to unit and care assumed. Patient transported with a Cardiac Cath Tech / Patient Care Tech.
TRANSFER - OUT REPORT:     Verbal report given to: Tash Brown, (at bedside). Report consisted of patient's Situation, Background, Assessment and   Recommendations(SBAR). Opportunity for questions and clarification was provided. Patient transported with a Registered Nurse. Patient transported to: recovery.
TWO ATTEMPTS FOR IV ON RIGHT ARM BY HAO ROSALES RN, JENNIFER WILHELM IS ATTEMPTING FOR AN IV
The physician has been notified.
None

## (undated) DEVICE — NEEDLE ANGIO 18GA L9CM NRML 1 WALL SMOOTH FINISH CLR HUB FOR

## (undated) DEVICE — PINNACLE INTRODUCER SHEATH: Brand: PINNACLE

## (undated) DEVICE — HEART CATH-SFMC: Brand: MEDLINE INDUSTRIES, INC.

## (undated) DEVICE — Device

## (undated) DEVICE — ADMINISTRATION SET 72 IN SINGLE LUER LCK NAMIC

## (undated) DEVICE — CATHETER KIT JL 4 JL5 6 FRX100 CM 110 CM 145 PGTL DXTERITY